# Patient Record
Sex: FEMALE | Race: WHITE | Employment: FULL TIME | URBAN - METROPOLITAN AREA
[De-identification: names, ages, dates, MRNs, and addresses within clinical notes are randomized per-mention and may not be internally consistent; named-entity substitution may affect disease eponyms.]

---

## 2016-09-03 LAB
CREATININE, EXTERNAL: 0.69
LDL-C, EXTERNAL: 122

## 2016-10-04 LAB — HBA1C MFR BLD HPLC: 5.5 %

## 2017-04-28 ENCOUNTER — OFFICE VISIT (OUTPATIENT)
Dept: INTERNAL MEDICINE CLINIC | Age: 42
End: 2017-04-28

## 2017-04-28 VITALS
SYSTOLIC BLOOD PRESSURE: 104 MMHG | BODY MASS INDEX: 29.27 KG/M2 | OXYGEN SATURATION: 98 % | TEMPERATURE: 98.5 F | HEIGHT: 61 IN | HEART RATE: 98 BPM | WEIGHT: 155 LBS | DIASTOLIC BLOOD PRESSURE: 68 MMHG | RESPIRATION RATE: 16 BRPM

## 2017-04-28 DIAGNOSIS — R42 VERTIGO: Primary | ICD-10-CM

## 2017-04-28 RX ORDER — BUPROPION HYDROCHLORIDE 75 MG/1
TABLET ORAL 2 TIMES DAILY
COMMUNITY
End: 2017-04-28

## 2017-04-28 NOTE — PROGRESS NOTES
Tania Lennon is a 42 y.o. female who was seen in clinic today (4/28/2017). Patient was seen with Dr Joseph Sosa (R2 at [facility]). Assessment & Plan:  Hung Ch was seen today for dizziness. Diagnoses and all orders for this visit:    Vertigo- this is a new problem, differential dx reviewed with the patient, sounds benign. Favor inner ear issue. Reassured does not sound serious. Reviewed vertigo exercises. Reviewed meclizine vs dramamine. Will have her see specialist.     -     REFERRAL TO ENT-OTOLARYNGOLOGY         Follow-up Disposition:  Return if symptoms worsen or fail to improve.       ----------------------------------------------------------------------    Subjective:  Vertigo  Patient complains of vertigo (off balance/lightheadedness). The symptoms started 2 weeks ago and are unchanged. Sensation is there constantly. Positions that make her symptoms worse: bending over, turning head, and getting up. She reports loud noises bother her ears (increase in sensitivity & lead to h/a, bilateral). Associated ear symptoms: fullness. She denies headaches, paresthesias, weakness, vision changes. Recent infections: none. Head trauma: denied. Previous workup/treatments: none  New medications: no.  Did go to [facility], on Monday. They recommended Dramamine w/o any relief. Prior to Admission medications    Medication Sig Start Date End Date Taking? Authorizing Provider   BUPROPION HCL (WELLBUTRIN PO) Take  by mouth. Yes Historical Provider   methylphenidate (RITALIN) 5 mg tablet Take  by mouth two (2) times a day. Yes Historical Provider   ESTRADIOL-NORGESTIMATE PO Take  by mouth. Yes Historical Provider   FLUoxetine (PROZAC) 20 mg capsule Take  by mouth daily. Yes Historical Provider   metFORMIN (GLUCOPHAGE) 500 mg tablet Take 2,000 mg by mouth daily. Yes Historical Provider   ASCORBATE CALCIUM (VITAMIN C PO) Take 1,000 mg by mouth daily.    Yes Historical Provider   CHOLECALCIFEROL, VITAMIN D3, (VITAMIN D3 PO) Take 2,000 Units by mouth daily. Yes Historical Provider   cyanocobalamin (VITAMIN B-12) 1,000 mcg tablet Take 1,000 mcg by mouth daily. Yes Historical Provider   Cetirizine (ZYRTEC) 10 mg cap Take  by mouth daily. Yes Historical Provider   albuterol (PROVENTIL HFA, VENTOLIN HFA, PROAIR HFA) 90 mcg/actuation inhaler Take 1 Puff by inhalation every six (6) hours as needed for Wheezing. 12/19/16  Yes Juan Daniel Espana MD          No Known Allergies        Review of Systems   Constitutional: Negative for chills, fever and malaise/fatigue. Respiratory: Negative for cough and shortness of breath. Cardiovascular: Negative for chest pain and palpitations. Gastrointestinal: Negative for abdominal pain, constipation, diarrhea, nausea and vomiting. Musculoskeletal: Negative for joint pain and myalgias. Neurological: Positive for dizziness. Objective:   Physical Exam   Constitutional: No distress. HENT:   Right Ear: Tympanic membrane is not erythematous and not bulging. No middle ear effusion. Left Ear: Tympanic membrane is not erythematous and not bulging. No middle ear effusion. Nose: No mucosal edema or rhinorrhea. Right sinus exhibits no maxillary sinus tenderness and no frontal sinus tenderness. Left sinus exhibits no maxillary sinus tenderness and no frontal sinus tenderness. Mouth/Throat: Uvula is midline and mucous membranes are normal. No oropharyngeal exudate or posterior oropharyngeal erythema. Eyes: Conjunctivae are normal. No scleral icterus. Neck: Neck supple. Cardiovascular: Regular rhythm and normal heart sounds. No murmur heard. Pulmonary/Chest: Effort normal and breath sounds normal. She has no wheezes. She has no rales. Lymphadenopathy:     She has no cervical adenopathy. Neurological: She has normal strength. No cranial nerve deficit or sensory deficit.          Visit Vitals    /68    Pulse 98    Temp 98.5 °F (36.9 °C) (Oral)    Resp 16    Ht 5' 1.45\" (1.561 m)    Wt 155 lb (70.3 kg)    LMP 04/14/2017 (Approximate)    SpO2 98%    BMI 28.86 kg/m2         Disclaimer:  Advised her to call back or return to office if symptoms worsen/change/persist.  Discussed expected course/resolution/complications of diagnosis in detail with patient. Medication risks/benefits/costs/interactions/alternatives discussed with patient. She was given an after visit summary which includes diagnoses, current medications, & vitals. She expressed understanding with the diagnosis and plan.         Bulmaro Sousa MD

## 2017-04-28 NOTE — MR AVS SNAPSHOT
Visit Information Date & Time Provider Department Dept. Phone Encounter #  
 4/28/2017  2:15 PM Gray Rebolledo, [address] [facility] [phone] [id number] Follow-up Instructions Return if symptoms worsen or fail to improve. Your Appointments 6/19/2017  2:30 PM  
PHYSICAL with Gray Rebolledo MD  
[facility] [address]) Appt Note: cpe; .  
 [address] [place] [address]  
[address] [place] [address] Upcoming Health Maintenance Date Due DTaP/Tdap/Td series (1 - Tdap) 8/7/1995 PAP AKA CERVICAL CYTOLOGY 8/7/1995 Allergies as of 4/28/2017  Review Complete On: 4/28/2017 By: Gray Rebolledo MD  
 No Known Allergies Current Immunizations  Reviewed on 4/28/2017 Name Date Influenza Vaccine 10/1/2016 Reviewed by Himanshu Greco RN on 4/28/2017 at  2:31 PM  
You Were Diagnosed With   
  
 Codes Comments Vertigo    -  Primary ICD-10-CM: S35 ICD-9-CM: 780.4 Vitals BP Pulse Temp Resp Height(growth percentile) Weight(growth percentile) 104/68 98 98.5 °F (36.9 °C) (Oral) 16 5' 1.45\" (1.561 m) 155 lb (70.3 kg) LMP SpO2 BMI OB Status Smoking Status 04/14/2017 (Approximate) 98% 28.86 kg/m2 Having regular periods Never Smoker BMI and BSA Data Body Mass Index Body Surface Area  
 28.86 kg/m 2 1.75 m 2 Preferred Pharmacy Pharmacy Name Phone [phone] [phone] Your Updated Medication List  
  
   
This list is accurate as of: 4/28/17  3:24 PM.  Always use your most recent med list.  
  
  
  
  
 albuterol 90 mcg/actuation inhaler Commonly known as:  PROVENTIL HFA, VENTOLIN HFA, PROAIR HFA Take 1 Puff by inhalation every six (6) hours as needed for Wheezing.   
  
 ESTRADIOL-NORGESTIMATE PO  
 Take  by mouth. FLUoxetine 20 mg capsule Commonly known as:  PROzac Take  by mouth daily. metFORMIN 500 mg tablet Commonly known as:  GLUCOPHAGE Take 2,000 mg by mouth daily. RITALIN 5 mg tablet Generic drug:  methylphenidate Take  by mouth two (2) times a day. VITAMIN B-12 1,000 mcg tablet Generic drug:  cyanocobalamin Take 1,000 mcg by mouth daily. VITAMIN C PO Take 1,000 mg by mouth daily. VITAMIN D3 PO Take 2,000 Units by mouth daily. WELLBUTRIN PO Take  by mouth. ZyrTEC 10 mg Cap Generic drug:  Cetirizine Take  by mouth daily. We Performed the Following REFERRAL TO ENT-OTOLARYNGOLOGY [YJL94 Custom] Follow-up Instructions Return if symptoms worsen or fail to improve. Referral Information Referral ID Referred By Referred To  
  
 [id number] Giles TONG [facility], [address] [place] [address] Visits Status Start Date End Date 1 New Request 4/28/17 4/28/18 If your referral has a status of pending review or denied, additional information will be sent to support the outcome of this decision. Patient Instructions Leonora Pryor or [facility] The other option is [facility]. [place] Exercises for Vertigo: Care Instructions Your Care Instructions Simple exercises can help you regain your balance when you have vertigo. If you have Ménière's disease, benign paroxysmal positional vertigo (BPPV), or another inner ear problem, you may have vertigo off and on. Do these exercises first thing in the morning and before you go to bed. You might get dizzy when you first start them. If this happens, try to do them for at least 5 minutes. Do a group of exercises at a time, starting at the top of the list. It may take several weeks before you can do all the exercises without feeling dizzy. Follow-up care is a key part of your treatment and safety. Be sure to make and go to all appointments, and call your doctor if you are having problems. It's also a good idea to know your test results and keep a list of the medicines you take. How can you care for yourself at home? Exercise 1 While sitting on the side of the bed and holding your head still: 
· Look up as far as you can. · Look down as far as you can. · Look from side to side as far as you can. · Stretch your arm straight out in front of you. Focus on your index finger. Continue to focus on your finger while you bring it to your nose. Exercise 2 While sitting on the side of the bed: · Bring your head as far back as you can. · Bring your head forward to touch your chin to your chest. 
· Turn your head from side to side. · Do these exercises first with your eyes open. Then try with your eyes closed. Exercise 3 While sitting on the side of the bed: · Shrug your shoulders straight upward, then relax them. · Bend over and try to touch the ground with your fingers. Then go back to a sitting position. · Toss a small ball from one hand to the other. Throw the ball higher than your eyes so you have to look up. Exercise 4 While standing (with someone close by if you feel uncomfortable): 
· Repeat Exercise 1. 
· Repeat Exercise 2. 
· Pass a ball between your legs and above your head. · Sit down and then stand up. Repeat. Turn around in a [place] a different way each time you stand. · With someone close by to help you, try the above exercises with your eyes closed. Exercise 5 In a room that is cleared of obstacles: 
· Walk to a corner of the room, turn to your right, and walk back to the starting point. Now, repeat and turn left. · Walk up and down a slope. Now try stairs. · While holding on to someone's arm, try these exercises with your eyes closed. When should you call for help? Watch closely for changes in your health, and be sure to contact your doctor if: 
· Your vertigo gets worse. · You want more information about vertigo. · You want more information about exercises for vertigo. Where can you learn more? Go to http://zaire-bebe.info/. Enter R847 in the search box to learn more about \"Caw[place]orne Exercises for Vertigo: Care Instructions. \" Current as of: July 29, 2016 Content Version: 11.2 © 6218-4806 Motor[employer]. Care instructions adapted under license by [employer] (which disclaims liability or warranty for this information). If you have questions about a medical condition or this instruction, always ask your healthcare professional. [address] any warranty or liability for your use of this information. Vertigo: Exercises Your Care Instructions Here are some examples of typical rehabilitation exercises for your condition. Start each exercise slowly. Ease off the exercise if you start to have pain. Your doctor or physical therapist will tell you when you can start these exercises and which ones will work best for you. How to do the exercises Note: Do these exercises twice a day. Try to progress to doing each head movement 15 to 20 times. Then try to do them with your eyes closed. Exercise 1 1. Stand with a chair in front of you and a wall behind you. If you begin to fall, you may use them for support. 2. Stand with your feet together and your arms at your sides. 3. Move your head up and down 10 times. Exercise 2 Move your head side to side 10 times. Exercise 3 Move your head diagonally up and down 10 times. Exercise 4 Move your head diagonally up and down 10 times on the other side. Follow-up care is a key part of your treatment and safety.  Be sure to make and go to all appointments, and call your doctor if you are having problems. It's also a good idea to know your test results and keep a list of the medicines you take. Where can you learn more? Go to http://zaire-bebe.info/. Enter F349 in the search box to learn more about \"Vertigo: Exercises. \" Current as of: July 29, 2016 Content Version: 11.2 © 8246-7081 BitWave. Care instructions adapted under license by [employer] (which disclaims liability or warranty for this information). If you have questions about a medical condition or this instruction, always ask your healthcare professional. [place] [address] any warranty or liability for your use of this information. Introducing [facility] [place] [facility]! [employer] introduces [employer] patient portal. Now you can access parts of your medical record, email your doctor's office, and request medication refills online. 1. In your internet browser, go to [email or web address] 2. Click on the First Time User? Click Here link in the Sign In box. You will see the New Member Sign Up page. 3. Enter your [employer] Access Code exactly as it appears below. You will not need to use this code after youve completed the sign-up process. If you do not sign up before the expiration date, you must request a new code. · [employer] Access Code: [email or web address] Expires: 7/27/2017  1:44 PM 
 
4. Enter the last four digits of your Social Security Number (xxxx) and Date of Birth (mm/dd/yyyy) as indicated and click Submit. You will be taken to the next sign-up page. 5. Create a Scarlet Lens Productionst ID. This will be your [employer] login ID and cannot be changed, so think of one that is secure and easy to remember. 6. Create a [employer] password. You can change your password at any time. 7. Enter your Password Reset Question and Answer. This can be used at a later time if you forget your password. 8. Enter your e-mail address.  You will receive e-mail notification when new information is available in [employer]. 9. Click Sign Up. You can now view and download portions of your medical record. 10. Click the Download Summary menu link to download a portable copy of your medical information. If you have questions, please visit the Frequently Asked Questions section of the [employer] website. Remember, [employer] is NOT to be used for urgent needs. For medical emergencies, dial 911. Now available from your iPhone and Android! Please provide this summary of care documentation to your next provider. Your primary care clinician is listed as Martina Pisano. If you have any questions after today's visit, please call [phone].

## 2017-04-28 NOTE — PATIENT INSTRUCTIONS
Skip Morse or [facility]  The other option is [facility]. Caw[place]orne Exercises for Vertigo: Care Instructions  Your Care Instructions  Simple exercises can help you regain your balance when you have vertigo. If you have Ménière's disease, benign paroxysmal positional vertigo (BPPV), or another inner ear problem, you may have vertigo off and on. Do these exercises first thing in the morning and before you go to bed. You might get dizzy when you first start them. If this happens, try to do them for at least 5 minutes. Do a group of exercises at a time, starting at the top of the list. It may take several weeks before you can do all the exercises without feeling dizzy. Follow-up care is a key part of your treatment and safety. Be sure to make and go to all appointments, and call your doctor if you are having problems. It's also a good idea to know your test results and keep a list of the medicines you take. How can you care for yourself at home? Exercise 1  While sitting on the side of the bed and holding your head still:  · Look up as far as you can. · Look down as far as you can. · Look from side to side as far as you can. · Stretch your arm straight out in front of you. Focus on your index finger. Continue to focus on your finger while you bring it to your nose. Exercise 2  While sitting on the side of the bed:  · Bring your head as far back as you can. · Bring your head forward to touch your chin to your chest.  · Turn your head from side to side. · Do these exercises first with your eyes open. Then try with your eyes closed. Exercise 3  While sitting on the side of the bed:  · Shrug your shoulders straight upward, then relax them. · Bend over and try to touch the ground with your fingers. Then go back to a sitting position. · Toss a small ball from one hand to the other. Throw the ball higher than your eyes so you have to look up.   Exercise 4  While standing (with someone close by if you feel uncomfortable):  · Repeat Exercise 1.  · Repeat Exercise 2.  · Pass a ball between your legs and above your head. · Sit down and then stand up. Repeat. Turn around in a [place] a different way each time you stand. · With someone close by to help you, try the above exercises with your eyes closed. Exercise 5  In a room that is cleared of obstacles:  · Walk to a corner of the room, turn to your right, and walk back to the starting point. Now, repeat and turn left. · Walk up and down a slope. Now try stairs. · While holding on to someone's arm, try these exercises with your eyes closed. When should you call for help? Watch closely for changes in your health, and be sure to contact your doctor if:  · Your vertigo gets worse. · You want more information about vertigo. · You want more information about exercises for vertigo. Where can you learn more? Go to http://zaire-bebe.info/. Enter I748 in the search box to learn more about \"Caw[place]orne Exercises for Vertigo: Care Instructions. \"  Current as of: July 29, 2016  Content Version: 11.2  © 1492-8441 [employer]. Care instructions adapted under license by [employer] (which disclaims liability or warranty for this information). If you have questions about a medical condition or this instruction, always ask your healthcare professional. [place] [address] any warranty or liability for your use of this information. Vertigo: Exercises  Your Care Instructions  Here are some examples of typical rehabilitation exercises for your condition. Start each exercise slowly. Ease off the exercise if you start to have pain. Your doctor or physical therapist will tell you when you can start these exercises and which ones will work best for you. How to do the exercises  Note: Do these exercises twice a day. Try to progress to doing each head movement 15 to 20 times.  Then try to do them with your eyes closed. Exercise 1    1. Stand with a chair in front of you and a wall behind you. If you begin to fall, you may use them for support. 2. Stand with your feet together and your arms at your sides. 3. Move your head up and down 10 times. Exercise 2    Move your head side to side 10 times. Exercise 3    Move your head diagonally up and down 10 times. Exercise 4    Move your head diagonally up and down 10 times on the other side. Follow-up care is a key part of your treatment and safety. Be sure to make and go to all appointments, and call your doctor if you are having problems. It's also a good idea to know your test results and keep a list of the medicines you take. Where can you learn more? Go to http://zaire-bebe.info/. Enter F349 in the search box to learn more about \"Vertigo: Exercises. \"  Current as of: July 29, 2016  Content Version: 11.2  © 5664-0598 [employer]. Care instructions adapted under license by [employer] (which disclaims liability or warranty for this information). If you have questions about a medical condition or this instruction, always ask your healthcare professional. [place] [address] any warranty or liability for your use of this information.

## 2017-06-19 ENCOUNTER — OFFICE VISIT (OUTPATIENT)
Dept: INTERNAL MEDICINE CLINIC | Age: 42
End: 2017-06-19

## 2017-06-19 VITALS
HEART RATE: 86 BPM | HEIGHT: 61 IN | DIASTOLIC BLOOD PRESSURE: 68 MMHG | OXYGEN SATURATION: 98 % | BODY MASS INDEX: 29.45 KG/M2 | TEMPERATURE: 98.2 F | WEIGHT: 156 LBS | SYSTOLIC BLOOD PRESSURE: 102 MMHG | RESPIRATION RATE: 12 BRPM

## 2017-06-19 DIAGNOSIS — R73.03 PRE-DIABETES: ICD-10-CM

## 2017-06-19 DIAGNOSIS — G47.11 HYPERSOMNIA, IDIOPATHIC: ICD-10-CM

## 2017-06-19 DIAGNOSIS — F41.9 ANXIETY: ICD-10-CM

## 2017-06-19 DIAGNOSIS — Z23 ENCOUNTER FOR IMMUNIZATION: ICD-10-CM

## 2017-06-19 DIAGNOSIS — Z00.00 ROUTINE PHYSICAL EXAMINATION: Primary | ICD-10-CM

## 2017-06-19 PROBLEM — R42 MAL DE DEBARQUEMENT: Status: ACTIVE | Noted: 2017-06-19

## 2017-06-19 RX ORDER — METHYLPHENIDATE HYDROCHLORIDE 5 MG/1
5 TABLET ORAL 3 TIMES DAILY
COMMUNITY
End: 2018-04-13 | Stop reason: SDUPTHER

## 2017-06-19 NOTE — MR AVS SNAPSHOT
Visit Information Date & Time Provider Department Dept. Phone Encounter #  
 6/19/2017  2:30 PM Kaycee Garzon, [address] [place] [address] Internal Medicine [phone] [id number] Follow-up Instructions Return in about 1 year (around 6/19/2018) for FULL PHYSICAL - 30 minutes. Upcoming Health Maintenance Date Due DTaP/Tdap/Td series (1 - Tdap) 8/7/1995 INFLUENZA AGE 9 TO ADULT 8/1/2017 BREAST CANCER SCRN MAMMOGRAM 1/27/2018 PAP AKA CERVICAL CYTOLOGY 1/21/2019 Allergies as of 6/19/2017  Review Complete On: 6/19/2017 By: Kaycee Garzon MD  
 No Known Allergies Current Immunizations  Reviewed on 6/19/2017 Name Date Influenza Vaccine 10/1/2016 Tdap  Incomplete Reviewed by Juan Angulo RN on 6/19/2017 at  2:47 PM  
You Were Diagnosed With   
  
 Codes Comments Routine physical examination    -  Primary ICD-10-CM: Z00.00 ICD-9-CM: V70.0 Encounter for immunization     ICD-10-CM: Q69 ICD-9-CM: V03.89 Hypersomnia, idiopathic     ICD-10-CM: G47.11 ICD-9-CM: 780.54 Anxiety     ICD-10-CM: F41.9 ICD-9-CM: 300.00 Pre-diabetes     ICD-10-CM: R73.03 
ICD-9-CM: 790.29 Vitals BP Pulse Temp Resp Height(growth percentile) Weight(growth percentile) 102/68 86 98.2 °F (36.8 °C) (Oral) 12 5' 1.4\" (1.56 m) 156 lb (70.8 kg) LMP SpO2 BMI OB Status Smoking Status 06/08/2017 (Approximate) 98% 29.09 kg/m2 Having regular periods Never Smoker BMI and BSA Data Body Mass Index Body Surface Area  
 29.09 kg/m 2 1.75 m 2 Preferred Pharmacy Pharmacy Name Phone [phone] [phone] Your Updated Medication List  
  
   
This list is accurate as of: 6/19/17  3:21 PM.  Always use your most recent med list.  
  
  
  
  
 albuterol 90 mcg/actuation inhaler Commonly known as:  PROVENTIL HFA, VENTOLIN HFA, PROAIR HFA  
 Take 1 Puff by inhalation every six (6) hours as needed for Wheezing. ESTRADIOL-NORGESTIMATE PO Take  by mouth. FLUoxetine 20 mg capsule Commonly known as:  PROzac Take  by mouth daily. metFORMIN 500 mg tablet Commonly known as:  GLUCOPHAGE Take 2,000 mg by mouth daily. RITALIN 5 mg tablet Generic drug:  methylphenidate Take 5 mg by mouth three (3) times daily. VITAMIN B-12 1,000 mcg tablet Generic drug:  cyanocobalamin Take 1,000 mcg by mouth daily. VITAMIN C PO Take 1,000 mg by mouth daily. VITAMIN D3 PO Take 2,000 Units by mouth daily. WELLBUTRIN PO Take 75 mg by mouth daily. ZyrTEC 10 mg Cap Generic drug:  Cetirizine Take  by mouth daily. We Performed the Following CBC W/O DIFF [61228 CPT(R)] METABOLIC PANEL, COMPREHENSIVE [57623 CPT(R)] MN IMMUNIZ ADMIN,1 SINGLE/COMB VAC/TOXOID [id number] CPT(R)] TETANUS, DIPHTHERIA TOXOIDS AND ACELLULAR PERTUSSIS VACCINE (TDAP), IN INDIVIDS. >=7, IM Z632174 CPT(R)] Follow-up Instructions Return in about 1 year (around 6/19/2018) for FULL PHYSICAL - 30 minutes. Patient Instructions Well Visit, Ages 25 to 48: Care Instructions Your Care Instructions Physical exams can help you stay healthy. Your doctor has checked your overall health and may have suggested ways to take good care of yourself. He or she also may have recommended tests. At home, you can help prevent illness with healthy eating, regular exercise, and other steps. Follow-up care is a key part of your treatment and safety. Be sure to make and go to all appointments, and call your doctor if you are having problems. It's also a good idea to know your test results and keep a list of the medicines you take. How can you care for yourself at home? · Reach and stay at a healthy weight.  This will lower your risk for many problems, such as obesity, diabetes, heart disease, and high blood pressure. · Get at least 30 minutes of physical activity on most days of the week. Walking is a good choice. You also may want to do other activities, such as running, swimming, cycling, or playing tennis or team sports. Discuss any changes in your exercise program with your doctor. · Do not smoke or allow others to smoke around you. If you need help quitting, talk to your doctor about stop-smoking programs and medicines. These can increase your chances of quitting for good. · Talk to your doctor about whether you have any risk factors for sexually transmitted infections (STIs). Having one sex partner (who does not have STIs and does not have sex with anyone else) is a good way to avoid these infections. · Use birth control if you do not want to have children at this time. Talk with your doctor about the choices available and what might be best for you. · Protect your skin from too much sun. When you're outdoors from 10 a.m. to 4 p.m., stay in the shade or cover up with clothing and a hat with a wide brim. Wear sunglasses that block UV rays. Even when it's cloudy, put broad-spectrum sunscreen (SPF 30 or higher) on any exposed skin. · See a dentist one or two times a year for checkups and to have your teeth cleaned. · Wear a seat belt in the car. · Drink alcohol in moderation, if at all. That means no more than 2 drinks a day for men and 1 drink a day for women. Follow your doctor's advice about when to have certain tests. These tests can spot problems early. For everyone · Cholesterol. Have the fat (cholesterol) in your blood tested after age 21. Your doctor will tell you how often to have this done based on your age, family history, or other things that can increase your risk for heart disease. · Blood pressure. Have your blood pressure checked during a routine doctor visit.  Your doctor will tell you how often to check your blood pressure based on your age, your blood pressure results, and other factors. · Vision. Talk with your doctor about how often to have a glaucoma test. 
· Diabetes. Ask your doctor whether you should have tests for diabetes. · Colon cancer. Have a test for colon cancer at age 48. You may have one of several tests. If you are younger than 48, you may need a test earlier if you have any risk factors. Risk factors include whether you already had a precancerous polyp removed from your colon or whether your parent, brother, sister, or child has had colon cancer. For women · Breast exam and mammogram. Talk to your doctor about when you should have a clinical breast exam and a mammogram. Medical experts differ on whether and how often women under 50 should have these tests. Your doctor can help you decide what is right for you. · Pap test and pelvic exam. Begin Pap tests at age 24. A Pap test is the best way to find cervical cancer. The test often is part of a pelvic exam. Ask how often to have this test. 
· Tests for sexually transmitted infections (STIs). Ask whether you should have tests for STIs. You may be at risk if you have sex with more than one person, especially if your partners do not wear condoms. For men · Tests for sexually transmitted infections (STIs). Ask whether you should have tests for STIs. You may be at risk if you have sex with more than one person, especially if you do not wear a condom. · Testicular cancer exam. Ask your doctor whether you should check your testicles regularly. · Prostate exam. Talk to your doctor about whether you should have a blood test (called a PSA test) for prostate cancer. Experts differ on whether and when men should have this test. Some experts suggest it if you are older than 39 and are -American or have a father or brother who got prostate cancer when he was younger than 72. When should you call for help? Watch closely for changes in your health, and be sure to contact your doctor if you have any problems or symptoms that concern you. Where can you learn more? Go to http://zaire-bebe.info/. Enter P072 in the search box to learn more about \"Well Visit, Ages 25 to 48: Care Instructions. \" Current as of: July 19, 2016 Content Version: 11.2 © 1008-8243 [employer]. Care instructions adapted under license by [employer] (which disclaims liability or warranty for this information). If you have questions about a medical condition or this instruction, always ask your healthcare professional. [place] [address] any warranty or liability for your use of this information. Introducing [facility]! Marion Peacock introduces [employer] [facility] [facility] patient portal. Now you can access parts of your medical record, email your doctor's office, and request medication refills online. 1. In your internet browser, go to [email or web address]. Echolocation/[address] 2. Click on the First Time User? Click Here link in the Sign In box. You will see the New Member Sign Up page. 3. Enter your [employer] [facility] Access Code exactly as it appears below. You will not need to use this code after youve completed the sign-up process. If you do not sign up before the expiration date, you must request a new code. · [employer] [facility] [facility] Access Code: [email or web address] Expires: 7/27/2017  1:44 PM 
 
4. Enter the last four digits of your Social Security Number (xxxx) and Date of Birth (mm/dd/yyyy) as indicated and click Submit. You will be taken to the next sign-up page. 5. Create a [employer] [facility] [facility] ID. This will be your [employer] [facility] login ID and cannot be changed, so think of one that is secure and easy to remember. 6. Create a [employer] [facility] password. You can change your password at any time. 7. Enter your Password Reset Question and Answer. This can be used at a later time if you forget your password. 8. Enter your e-mail address. You will receive e-mail notification when new information is available in [address] 9. Click Sign Up. You can now view and download portions of your medical record. 10. Click the Download Summary menu link to download a portable copy of your medical information. If you have questions, please visit the Frequently Asked Questions section of the [email or web address] website. Remember, [email or web address] is NOT to be used for urgent needs. For medical emergencies, dial 911. Now available from your iPhone and Android! Please provide this summary of care documentation to your next provider. Your primary care clinician is listed as Wabeno Curl. If you have any questions after today's visit, please call [phone].

## 2017-06-19 NOTE — PROGRESS NOTES
Hector Lennon is a 42 y.o. female  who present for routine immunizations. she  denies any symptoms , reactions or allergies that would exclude them from being immunized today. Risks and adverse reactions were discussed and the VIS was given to them. All questions were addressed. she was observed for 5 min post injection. There were no reactions observed.     Edgar Painting RN

## 2017-06-19 NOTE — PATIENT INSTRUCTIONS

## 2017-06-19 NOTE — PROGRESS NOTES
Fernando Lennon is a 42 y.o. female who was seen in clinic today (6/19/2017) for a full physical.       Assessment & Plan:   Diagnoses and all orders for this visit:    1. Routine physical examination  -     METABOLIC PANEL, COMPREHENSIVE  -     CBC W/O DIFF    2. Encounter for immunization  -     TETANUS, DIPHTHERIA TOXOIDS AND ACELLULAR PERTUSSIS VACCINE (TDAP), IN INDIVIDS. >=7, IM  -     CO IMMUNIZ ADMIN,1 SINGLE/COMB VAC/TOXOID    3. Hypersomnia, idiopathic- stable, continue current treatment, [facility]  reviewed & will defer to specialist     4. Anxiety- well controlled, continue current treatment, she will cont w/ OBGYN writing meds for now, did review effects of SSRI on weight and how Wellbutrin is not approved for anxiety but since doing well will not recommend any changes. 5. Pre-diabetes- improved, most recent labs at goal, cont current meds, cont to work on diet & exercise. Follow-up Disposition:  Return in about 1 year (around 6/19/2018) for FULL PHYSICAL - 30 minutes. ------------------------------------------------------------------------------------------    Subjective:   Cherri Mckenzie is here today for a full physical.      Health Maintenance  Immunizations:    Influenza: up to date. Tetanus: not UTD- will do today. Cancer screening:    Cervical: reviewed guidelines, UTD. Breast: reviewed guidelines, reviewed SBE with her, UTD      Patient Care Team:  Bertram Oreilly MD as PCP - General (Internal Medicine)  Mechelle Lewis MD (Obstetrics & Gynecology)  Hiram Morales MD (Allergy)  Orlando Hernández MD (Dermatology)  Yolanda Solomon MD (Sleep Medicine)         The following sections were reviewed & updated as appropriate: PMH, PSH, FH, and SH.     Patient Active Problem List   Diagnosis Code    Celiac disease K90.0    Perennial allergic rhinitis J30.89    Pre-diabetes R73.03    Hypersomnia, idiopathic G47.11    Anxiety F41.9    Mal de debarquement R42          Prior to Admission medications    Medication Sig Start Date End Date Taking? Authorizing Provider   methylphenidate (RITALIN) 5 mg tablet Take 5 mg by mouth three (3) times daily. Yes Historical Provider   BUPROPION HCL (WELLBUTRIN PO) Take 75 mg by mouth daily. Yes Historical Provider   ESTRADIOL-NORGESTIMATE PO Take  by mouth. Yes Historical Provider   FLUoxetine (PROZAC) 20 mg capsule Take  by mouth daily. Yes Historical Provider   metFORMIN (GLUCOPHAGE) 500 mg tablet Take 2,000 mg by mouth daily. Yes Historical Provider   ASCORBATE CALCIUM (VITAMIN C PO) Take 1,000 mg by mouth daily. Yes Historical Provider   CHOLECALCIFEROL, VITAMIN D3, (VITAMIN D3 PO) Take 2,000 Units by mouth daily. Yes Historical Provider   cyanocobalamin (VITAMIN B-12) 1,000 mcg tablet Take 1,000 mcg by mouth daily. Yes Historical Provider   Cetirizine (ZYRTEC) 10 mg cap Take  by mouth daily. Yes Historical Provider   albuterol (PROVENTIL HFA, VENTOLIN HFA, PROAIR HFA) 90 mcg/actuation inhaler Take 1 Puff by inhalation every six (6) hours as needed for Wheezing. 12/19/16  Yes Can Lawson MD          No Known Allergies           Review of Systems   Constitutional: Negative for chills and fever. Respiratory: Negative for cough and shortness of breath. Cardiovascular: Negative for chest pain and palpitations. Gastrointestinal: Negative for abdominal pain, blood in stool, constipation, diarrhea, heartburn, nausea and vomiting. Musculoskeletal: Negative for joint pain and myalgias. Neurological: Negative for tingling, focal weakness and headaches. Endo/Heme/Allergies: Does not bruise/bleed easily. Psychiatric/Behavioral: Negative for depression. The patient is not nervous/anxious and does not have insomnia. Objective:   Physical Exam   Constitutional: She appears well-developed. No distress.    HENT:   Right Ear: Tympanic membrane, external ear and ear canal normal.   Left Ear: Tympanic membrane, external ear and ear canal normal.   Nose: Nose normal.   Mouth/Throat: Uvula is midline, oropharynx is clear and moist and mucous membranes are normal. No posterior oropharyngeal erythema. Eyes: Conjunctivae and lids are normal. No scleral icterus. Neck: Neck supple. Carotid bruit is not present. No thyromegaly present. Cardiovascular: Regular rhythm and normal heart sounds. No murmur heard. Pulses:       Dorsalis pedis pulses are 2+ on the right side, and 2+ on the left side. Posterior tibial pulses are 2+ on the right side, and 2+ on the left side. Pulmonary/Chest: Effort normal and breath sounds normal. She has no wheezes. She has no rales. Abdominal: Soft. Bowel sounds are normal. She exhibits no mass. There is no hepatosplenomegaly. There is no tenderness. Musculoskeletal: Normal range of motion. She exhibits no edema. Cervical back: Normal.        Thoracic back: She exhibits no bony tenderness. Lumbar back: Normal.   Lymphadenopathy:     She has no cervical adenopathy. Neurological: She has normal strength. No sensory deficit. Skin: No rash noted. Psychiatric: She has a normal mood and affect. Her behavior is normal.          Visit Vitals    /68    Pulse 86    Temp 98.2 °F (36.8 °C) (Oral)    Resp 12    Ht 5' 1.4\" (1.56 m)    Wt 156 lb (70.8 kg)    LMP 06/08/2017 (Approximate)    SpO2 98%    BMI 29.09 kg/m2          Advised her to call back or return to office if symptoms worsen/change/persist.  Discussed expected course/resolution/complications of diagnosis in detail with patient. Medication risks/benefits/costs/interactions/alternatives discussed with patient. She was given an after visit summary which includes diagnoses, current medications, & vitals. She expressed understanding with the diagnosis and plan.         Joe Vaz MD

## 2017-06-20 LAB
ALBUMIN SERPL-MCNC: 4 G/DL (ref 3.5–5.5)
ALBUMIN/GLOB SERPL: 1.5 {RATIO} (ref 1.2–2.2)
ALP SERPL-CCNC: 80 IU/L (ref 39–117)
ALT SERPL-CCNC: 12 IU/L (ref 0–32)
AST SERPL-CCNC: 14 IU/L (ref 0–40)
BILIRUB SERPL-MCNC: <0.2 MG/DL (ref 0–1.2)
BUN SERPL-MCNC: 14 MG/DL (ref 6–24)
BUN/CREAT SERPL: 22 (ref 9–23)
CALCIUM SERPL-MCNC: 9.4 MG/DL (ref 8.7–10.2)
CHLORIDE SERPL-SCNC: 99 MMOL/L (ref 96–106)
CO2 SERPL-SCNC: 24 MMOL/L (ref 18–29)
CREAT SERPL-MCNC: 0.65 MG/DL (ref 0.57–1)
ERYTHROCYTE [DISTWIDTH] IN BLOOD BY AUTOMATED COUNT: 14.2 % (ref 12.3–15.4)
GLOBULIN SER CALC-MCNC: 2.7 G/DL (ref 1.5–4.5)
GLUCOSE SERPL-MCNC: 137 MG/DL (ref 65–99)
HCT VFR BLD AUTO: 35.8 % (ref 34–46.6)
HGB BLD-MCNC: 12.1 G/DL (ref 11.1–15.9)
MCH RBC QN AUTO: 31 PG (ref 26.6–33)
MCHC RBC AUTO-ENTMCNC: 33.8 G/DL (ref 31.5–35.7)
MCV RBC AUTO: 92 FL (ref 79–97)
PLATELET # BLD AUTO: 392 X10E3/UL (ref 150–379)
POTASSIUM SERPL-SCNC: 4 MMOL/L (ref 3.5–5.2)
PROT SERPL-MCNC: 6.7 G/DL (ref 6–8.5)
RBC # BLD AUTO: 3.9 X10E6/UL (ref 3.77–5.28)
SODIUM SERPL-SCNC: 137 MMOL/L (ref 134–144)
WBC # BLD AUTO: 9.1 X10E3/UL (ref 3.4–10.8)

## 2017-12-28 ENCOUNTER — OFFICE VISIT (OUTPATIENT)
Dept: SLEEP MEDICINE | Age: 42
End: 2017-12-28

## 2017-12-28 VITALS
BODY MASS INDEX: 30.21 KG/M2 | WEIGHT: 160 LBS | SYSTOLIC BLOOD PRESSURE: 134 MMHG | HEART RATE: 94 BPM | OXYGEN SATURATION: 95 % | TEMPERATURE: 98.3 F | HEIGHT: 61 IN | DIASTOLIC BLOOD PRESSURE: 65 MMHG

## 2017-12-28 DIAGNOSIS — G47.30 HYPERSOMNIA WITH SLEEP APNEA: Primary | ICD-10-CM

## 2017-12-28 DIAGNOSIS — G47.10 HYPERSOMNIA WITH SLEEP APNEA: Primary | ICD-10-CM

## 2017-12-28 DIAGNOSIS — R73.03 PRE-DIABETES: ICD-10-CM

## 2017-12-28 NOTE — PROGRESS NOTES
[address] [facility] [place]. [place], [address]  Tel.  [phone]  Fax. [address] [facility] [address]  [place], [address] [facility]  Tel.  [phone]  Fax. [phone] [address] [facility] [address]  Tel.  [phone]  Fax. [phone]         Subjective:      Corinna Mata is an 36 y.o. female self- referred for evaluation for a sleep disorder. She complains of snoring, excessive daytime sleepiness associated with wanting to switch sleep providers. Symptoms began several months ago, unchanged since that time. She usually can fall asleep in variable minutes. She is wondering why she is so sleepy during the daytime yet cant settle down easily at night. Family or house members note snoring. She denies falling asleep while driving. Zofia Lennon does not wake up frequently at night. She is bothered by waking up too early and left unable to get back to sleep. She actually sleeps about 6 hours at night and wakes up about 3 times during the night. She does work shifts:  First Shift. Fadumo De La Cruz indicates she does not get too little sleep at night. Her bedtime is 1000. She awakens at 0630. She does take naps. She takes 1 naps a week lasting 45 min to an hour, Minute(s). She has the following observed behaviors:  ;  .  Other remarks:   previous records reviewed. She had a sleep study in 2015, at that time AHI was 4/hour, MSLT done which showed MSL less than 2 minutes with 4 naps, no REM periods. Since that time, she has gained 25 pounds. She has been maintained on ritalin 5 mg TID. Last dose around 3 pm for hypersomnia. Since 2015, she has had difficulty losing weight and has been diagnosed with pre-diabetes  She denies cataplexy, or sleep paralysis  Fletcher Sleepiness Score: 16   which reflect moderate daytime drowsiness.     Allergies   Allergen Reactions    Wheat Swelling         Current Outpatient Prescriptions:     methylphenidate (RITALIN) 5 mg tablet, Take 5 mg by mouth three (3) times daily. , Disp: , Rfl:     BUPROPION HCL (WELLBUTRIN PO), Take 75 mg by mouth daily. , Disp: , Rfl:     ESTRADIOL-NORGESTIMATE PO, Take  by mouth., Disp: , Rfl:     FLUoxetine (PROZAC) 20 mg capsule, Take  by mouth daily. , Disp: , Rfl:     metFORMIN (GLUCOPHAGE) 500 mg tablet, Take 2,000 mg by mouth daily. , Disp: , Rfl:     ASCORBATE CALCIUM (VITAMIN C PO), Take 1,000 mg by mouth daily. , Disp: , Rfl:     CHOLECALCIFEROL, VITAMIN D3, (VITAMIN D3 PO), Take 2,000 Units by mouth daily. , Disp: , Rfl:     cyanocobalamin (VITAMIN B-12) 1,000 mcg tablet, Take 1,000 mcg by mouth daily. , Disp: , Rfl:     Cetirizine (ZYRTEC) 10 mg cap, Take  by mouth daily. , Disp: , Rfl:     albuterol (PROVENTIL HFA, VENTOLIN HFA, PROAIR HFA) 90 mcg/actuation inhaler, Take 1 Puff by inhalation every six (6) hours as needed for Wheezing., Disp: 1 Inhaler, Rfl: 2     She  has a past medical history of Anxiety (2016); Celiac disease (2016); Hypersomnia, idiopathic (2016); Perennial allergic rhinitis (2016); and Pre-diabetes (2016). She  has a past surgical history that includes hx  section (2004); hx  section (10/2005); hx dilation and curettage (10/2003); and hx endoscopy (). She family history includes Alcohol abuse in her maternal uncle; Diabetes in her father; Heart Disease in her father; High Cholesterol in her father; Hypertension in her father; No Known Problems in her brother, brother, mother, and son; Osteoporosis in her maternal grandmother; Other in her daughter; Sleep Apnea in her father; Stroke in her maternal grandfather and maternal grandmother. She  reports that she has never smoked. She has never used smokeless tobacco. She reports that she drinks alcohol. She reports that she does not use illicit drugs. Review of Systems:  Constitutional: +weight gain. Eyes:  No blurred vision.   CVS:  No significant chest pain  Pulm:  No significant shortness of breath  GI:  No significant nausea or vomiting  :  No significant nocturia  Musculoskeletal:  No significant joint pain at night  Skin:  No significant rashes  Neuro:  No significant dizziness   Psych:  No active mood issues    Sleep Review of Systems: notable for + difficulty falling asleep; infrequent awakenings at night;  regular dreaming noted; no nightmares ; no early morning headaches; no memory problems; no concentration issues; no history of any automobile or occupational accidents due to daytime drowsiness. Objective:     Visit Vitals    /65    Pulse 94    Temp 98.3 °F (36.8 °C) (Oral)    Ht 5' 1.4\" (1.56 m)    Wt 160 lb (72.6 kg)    SpO2 95%    BMI 29.84 kg/m2         General:   Not in acute distress   Eyes:  Anicteric sclerae, no obvious strabismus   Nose:  No obvious nasal septum deviation    Oropharynx:   Class 3 oropharyngeal outlet, thick tongue base, enlarged and boggy uvula, low-lying soft palate, narrow tonsilo-pharyngeal pilars   Tonsils:   tonsils are present and normal   Neck:   Neck circ. in \"inches\": 14; midline trachea   Chest/Lungs:  Equal lung expansion, clear on auscultation    CVS:  Normal rate, regular rhythm; no JVD   Skin:  Warm to touch; no obvious rashes   Neuro:  No focal deficits ; no obvious tremor    Psych:  Normal affect,  normal countenance;          Assessment:       ICD-10-CM ICD-9-CM    1. Hypersomnia with sleep apnea G47.10 780.53 SLEEP STUDY UNATTENDED, 4 CHANNEL    G47.30     2. Pre-diabetes R73.03 790.29          Plan:     * The patient currently has a Moderate Risk for having sleep apnea. STOP-BANG score 3.  * PSG was ordered for initial evaluation. I want to reevaluate if she now has sleep apnea contributing to daytime sleepiness. I have reviewed the different types of sleep studies. Attended sleep studies and home sleep apnea tests. Home sleep testing tests only for the presence and severity of sleep apnea.  she understands that if the HSAT does not provide reliable result(such as poor data/failed HSAT recording), she may have to repeat the HSAT or come in for an attended polysomnogram. .clsot  she is not against a trial of PAP if found to have significant sleep apnea. The treatment plan was reviewed with the patient in detail and reviewed with the patient and the lead technologist. she understands that the lead technologist will be calling her  with the results and assisting with the next step in the treatment plan as outlined today during the consultation with me. All of her questions were addressed. If she has an AHI over 5 /hour, we will start APAP for a month and then reevaluate her symptoms of daytime sleepiness and then proceed with PSG/MSLT for further evaluation of hypersomnia. In the meantime, she can continue the ritalin but try not to take it after 2 pm, as it may be interfering with her sleep onset. Proper sleep hygiene and safe driving were reviewed today and all of her questions were addressed. * She was provided information on sleep apnea including coresponding risk factors and the importance of proper treatment. * Counseling was provided regarding proper sleep hygiene and safe driving. 2. Preidabetes -she continues on her current regimen. I have reviewed the relationship between sleep disordered breathing as it relates to diabetes.       Marcelina Acevedo MD  Diplomate in Sleep Medicine  [facility]

## 2017-12-28 NOTE — PATIENT INSTRUCTIONS
[address] [place]. [place], [address]  Tel.  [phone]  Fax. 100 [facility] 60  [place], [address] [facility]  Tel.  [phone]  Fax. [phone] [address]  Tel.  [phone]  Fax. [phone]     Sleep Apnea: After Your Visit  Your Care Instructions  Sleep apnea occurs when you frequently stop breathing for 10 seconds or longer during sleep. It can be mild to severe, based on the number of times per hour that you stop breathing or have slowed breathing. Blocked or narrowed airways in your nose, mouth, or throat can cause sleep apnea. Your airway can become blocked when your throat muscles and tongue relax during sleep. Sleep apnea is common, occurring in 1 out of 20 individuals. Individuals having any of the following characteristics should be evaluated and treated right away due to high risk and detrimental consequences from untreated sleep apnea:  1. Obesity  2. Congestive Heart failure  3. Atrial Fibrillation  4. Uncontrolled Hypertension  5. Type II Diabetes  6. Night-time Arrhythmias  7. Stroke  8. Pulmonary Hypertension  9. High-risk Driving Populations (pilots, truck drivers, etc.)  10. Patients Considering Weight-loss Surgery    How do you know you have sleep apnea? You probably have sleep apnea if you answer 'yes' to 3 or more of the following questions:  S - Have you been told that you Snore? T - Are you often Tired during the day? O - Has anyone Observed you stop breathing while sleeping? P- Do you have (or are being treated for) high blood Pressure? B - Are you obese (Body Mass Index > 35)? A - Is your Age 48years old or older? N - Is your Neck size greater than 16 inches? G - Are you male Gender? A sleep physician can prescribe a breathing device that prevents tissues in the throat from blocking your airway.  Or your doctor may recommend using a dental device (oral breathing device) to help keep your airway open. In some cases, surgery may be needed to remove enlarged tissues in the throat. Follow-up care is a key part of your treatment and safety. Be sure to make and go to all appointments, and call your doctor if you are having problems. It's also a good idea to know your test results and keep a list of the medicines you take. How can you care for yourself at home? · Lose weight, if needed. It may reduce the number of times you stop breathing or have slowed breathing. · Go to bed at the same time every night. · Sleep on your side. It may stop mild apnea. If you tend to roll onto your back, sew a pocket in the back of your pajama top. Put a tennis ball into the pocket, and stitch the pocket shut. This will help keep you from sleeping on your back. · Avoid alcohol and medicines such as sleeping pills and sedatives before bed. · Do not smoke. Smoking can make sleep apnea worse. If you need help quitting, talk to your doctor about stop-smoking programs and medicines. These can increase your chances of quitting for good. · Prop up the head of your bed 4 to 6 inches by putting bricks under the legs of the bed. · Treat breathing problems, such as a stuffy nose, caused by a cold or allergies. · Use a continuous positive airway pressure (CPAP) breathing machine if lifestyle changes do not help your apnea and your doctor recommends it. The machine keeps your airway from closing when you sleep. · If CPAP does not help you, ask your doctor whether you should try other breathing machines. A bilevel positive airway pressure machine has two types of air pressureâone for breathing in and one for breathing out. Another device raises or lowers air pressure as needed while you breathe. · If your nose feels dry or bleeds when using one of these machines, talk with your doctor about increasing moisture in the air. A humidifier may help.   · If your nose is runny or stuffy from using a breathing machine, talk with your doctor about using decongestants or a corticosteroid nasal spray. When should you call for help? Watch closely for changes in your health, and be sure to contact your doctor if:  · You still have sleep apnea even though you have made lifestyle changes. · You are thinking of trying a device such as CPAP. · You are having problems using a CPAP or similar machine. Where can you learn more? Go to [employer]. Enter K680 in the search box to learn more about \"Sleep Apnea: After Your Visit. \"   © 6855-3473 Healthwise, Incorporated. Care instructions adapted under license by [employer] (which disclaims liability or warranty for this information). This care instruction is for use with your licensed healthcare professional. If you have questions about a medical condition or this instruction, always ask your healthcare professional. Mikey Salcedo any warranty or liability for your use of this information. PROPER SLEEP HYGIENE    What to avoid  · Do not have drinks with caffeine, such as coffee or black tea, for 8 hours before bed. · Do not smoke or use other types of tobacco near bedtime. Nicotine is a stimulant and can keep you awake. · Avoid drinking alcohol late in the evening, because it can cause you to wake in the middle of the night. · Do not eat a big meal close to bedtime. If you are hungry, eat a light snack. · Do not drink a lot of water close to bedtime, because the need to urinate may wake you up during the night. · Do not read or watch TV in bed. Use the bed only for sleeping and sexual activity. What to try  · Go to bed at the same time every night, and wake up at the same time every morning. Do not take naps during the day. · Keep your bedroom quiet, dark, and cool. · Get regular exercise, but not within 3 to 4 hours of your bedtime. .  · Sleep on a comfortable pillow and mattress.   · If watching the clock makes you anxious, turn it facing away from you so you cannot see the time. · If you worry when you lie down, start a worry book. Well before bedtime, write down your worries, and then set the book and your concerns aside. · Try meditation or other relaxation techniques before you go to bed. · If you cannot fall asleep, get up and go to another room until you feel sleepy. Do something relaxing. Repeat your bedtime routine before you go to bed again. · Make your house quiet and calm about an hour before bedtime. Turn down the lights, turn off the TV, log off the computer, and turn down the volume on music. This can help you relax after a busy day. Drowsy Driving  The [address] [place] [address] Road Traffic Safety Administration cites drowsiness as a causing factor in more than 418,370 police reported crashes annually, resulting in 76,000 injuries and 1,500 deaths. Other surveys suggest 55% of people polled have driven while drowsy in the past year, 23% had fallen asleep but not crashed, 3% crashed, and 2% had and accident due to drowsy driving. Who is at risk? Young Drivers: One study of drowsy driving accidents states that 55% of the drivers were under 25 years. Of those, 75% were male. Shift Workers and Travelers: People who work overnight or travel across time zones frequently are at higher risk of experiencing Circadian Rhythm Disorders. They are trying to work and function when their body is programed to sleep. Sleep Deprived: Lack of sleep has a serious impact on your ability to pay attention or focus on a task. Consistently getting less than the average of 8 hours your body needs creates partial or cumulative sleep deprivation. Untreated Sleep Disorders: Sleep Apnea, Narcolepsy, R.L.S., and other sleep disorders (untreated) prevent a person from getting enough restful sleep. This leads to excessive daytime sleepiness and increases the risk for drowsy driving accidents by up to 7 times.   Medications / Alcohol: Even over the counter medications can cause drowsiness. Medications that impair a drivers attention should have a warning label. Alcohol naturally makes you sleepy and on its own can cause accidents. Combined with excessive drowsiness its effects are amplified. Signs of Drowsy Driving:   * You don't remember driving the last few miles   * You may drift out of your hugh   * You are unable to focus and your thoughts wander   * You may yawn more often than normal   * You have difficulty keeping your eyes open / nodding off   * Missing traffic signs, speeding, or tailgating  Prevention-   Good sleep hygiene, lifestyle and behavioral choices have the most impact on drowsy driving. There is no substitute for sleep and the average person requires 8 hours nightly. If you find yourself driving drowsy, stop and sleep. Consider the sleep hygiene tips provided during your visit as well. Medication Refill Policy: Refills for all medications require 1 week advance notice. Please have your pharmacy fax a refill request. We are unable to fax, or call in \"controled substance\" medications and you will need to pick these prescriptions up from our office. Adaptly Activation    Thank you for requesting access to Adaptly. Please follow the instructions below to securely access and download your online medical record. Adaptly allows you to send messages to your doctor, view your test results, renew your prescriptions, schedule appointments, and more. How Do I Sign Up? 1. In your internet browser, go to [email or web address]. 2. Click on the First Time User? Click Here link in the Sign In box. You will see the New Member Sign Up page. 3. Enter your Adaptly Access Code exactly as it appears below. You will not need to use this code after youve completed the sign-up process. If you do not sign up before the expiration date, you must request a new code.     Adaptly Access Code: [email or web address]  Expires: 3/28/2018  3:46 PM (This is the date your Adsame access code will )    4. Enter the last four digits of your Social Security Number (xxxx) and Date of Birth (mm/dd/yyyy) as indicated and click Submit. You will be taken to the next sign-up page. 5. Create a InVitaet ID. This will be your Adsame login ID and cannot be changed, so think of one that is secure and easy to remember. 6. Create a Adsame password. You can change your password at any time. 7. Enter your Password Reset Question and Answer. This can be used at a later time if you forget your password. 8. Enter your e-mail address. You will receive e-mail notification when new information is available in [address] 9. Click Sign Up. You can now view and download portions of your medical record. 10. Click the Download Summary menu link to download a portable copy of your medical information. Additional Information    If you have questions, please call [phone]. Remember, Adsame is NOT to be used for urgent needs. For medical emergencies, dial 911.

## 2018-01-03 ENCOUNTER — DOCUMENTATION ONLY (OUTPATIENT)
Dept: SLEEP MEDICINE | Age: 43
End: 2018-01-03

## 2018-01-04 ENCOUNTER — TELEPHONE (OUTPATIENT)
Dept: SLEEP MEDICINE | Age: 43
End: 2018-01-04

## 2018-01-04 DIAGNOSIS — G47.10 HYPERSOMNIA: Primary | ICD-10-CM

## 2018-01-04 DIAGNOSIS — G47.419 PRIMARY NARCOLEPSY WITHOUT CATAPLEXY: ICD-10-CM

## 2018-01-04 NOTE — TELEPHONE ENCOUNTER
Results of sleep study in [employer]  Lead tech to convey results to patient    Results of HSAT in [employer]    The home sleep apnea test showed AHI - 1.hour. THe lowest oxygen saturation was 86%. This correlates with a test that is negative for significant sleep apnea. We had discussed treatment plan at initial consultation. Based on the results of the home sleep apnea test, APAP is not indicated at this time. As discussed at our visit, we should proceed with the evlauation for hypersomnia/narcolepsy now that sleep apnea has been ruled out. Optimizing sleep habits by keeping bedtime and waketime regular; ensuring sufficient total sleep time; avoiding caffeine after 2 pm; avoid looking at the clock during the night. Ideally, the clock face should be turned away. A regular exercise schedule, at least 3 hours before bedtime, would be beneficial to improving sleep quality. avoid evening light and to use sunglasses in the late afternoon. Watching TV, using laptops, tablets and smartphones in the evening was discouraged. keep the bedroom cool and dark. Pets should not be allowed to sleep in the bed.       Staff to schedule psg/mslt, she should hold her ritalin 2 days before her test and have someone drive her to her testing

## 2018-01-04 NOTE — TELEPHONE ENCOUNTER
HSAT Returned    Date of Study: 1/2/2018    The following information was gathered from the patients study log:    · Lights off: 10:30 PM  · Estimated sleep onset: 10:45 PM    · Awakened a total of ? times  · The patient felt they slept 7 hours  · Patient took nothing before starting the test  · Sleep quality was same compared to a usual nights sleep. Further information provided: Very restless, got hot and recovering from a head cold.

## 2018-01-04 NOTE — TELEPHONE ENCOUNTER
----- Message from Zoe Majeste sent at 1/2/2018 11:50 AM EST -----  Regarding: HSAT P2P  Good Morning    This patient's HSAT need additional information. P2P can be completed by calling [facility].     [phone]; options 1, 2, 2

## 2018-01-05 NOTE — TELEPHONE ENCOUNTER
Reviewed sleep study results with patient. She expressed understanding and is willing to proceed with an evlauation for hypersomnia/narcolepsy now that sleep apnea has been ruled out.

## 2018-02-01 ENCOUNTER — OFFICE VISIT (OUTPATIENT)
Dept: INTERNAL MEDICINE CLINIC | Age: 43
End: 2018-02-01

## 2018-02-01 VITALS
SYSTOLIC BLOOD PRESSURE: 108 MMHG | OXYGEN SATURATION: 98 % | HEIGHT: 61 IN | RESPIRATION RATE: 16 BRPM | DIASTOLIC BLOOD PRESSURE: 76 MMHG | TEMPERATURE: 98.2 F | BODY MASS INDEX: 29.07 KG/M2 | HEART RATE: 84 BPM | WEIGHT: 154 LBS

## 2018-02-01 DIAGNOSIS — J40 BRONCHITIS: Primary | ICD-10-CM

## 2018-02-01 RX ORDER — ALBUTEROL SULFATE 90 UG/1
1 AEROSOL, METERED RESPIRATORY (INHALATION)
Qty: 1 INHALER | Refills: 2 | Status: SHIPPED | OUTPATIENT
Start: 2018-02-01 | End: 2019-10-23 | Stop reason: SDUPTHER

## 2018-02-01 RX ORDER — METHYLPREDNISOLONE 4 MG/1
TABLET ORAL
Qty: 1 DOSE PACK | Refills: 0 | Status: SHIPPED | OUTPATIENT
Start: 2018-02-01 | End: 2018-02-07

## 2018-02-01 RX ORDER — BENZONATATE 200 MG/1
200 CAPSULE ORAL
Qty: 30 CAP | Refills: 0 | Status: SHIPPED | OUTPATIENT
Start: 2018-02-01 | End: 2018-02-08

## 2018-02-01 NOTE — MR AVS SNAPSHOT
[address] 
[phone] Patient: Saleem Lennon MRN: [id number] AOY:[birth year] Visit Information Date & Time Provider Department Dept. Phone Encounter #  
 2/1/2018 11:15 AM Cynthia Stephens, [facility] [phone] Follow-up Instructions Return if symptoms worsen or fail to improve. Upcoming Health Maintenance Date Due  
 BREAST CANCER SCRN MAMMOGRAM 1/27/2018 PAP AKA CERVICAL CYTOLOGY 1/21/2019 DTaP/Tdap/Td series (2 - Td) 6/19/2027 Allergies as of 2/1/2018  Review Complete On: 2/1/2018 By: Cynthia Stephens MD  
  
 Severity Noted Reaction Type Reactions Wheat  12/28/2017    Swelling Current Immunizations  Reviewed on 2/1/2018 Name Date Influenza Vaccine 10/1/2016 Influenza Vaccine (Quad) PF 11/7/2017 12:00 AM  
 Tdap 6/19/2017 Reviewed by Colby Juan RN on 2/1/2018 at 11:17 AM  
You Were Diagnosed With   
  
 Codes Comments Bronchitis    -  Primary ICD-10-CM: K90 ICD-9-CM: 589 Vitals BP Pulse Temp Resp Height(growth percentile) Weight(growth percentile) 108/76 84 98.2 °F (36.8 °C) (Oral) 16 5' 1.4\" (1.56 m) 154 lb (69.9 kg) LMP SpO2 BMI OB Status Smoking Status 01/31/2018 (Exact Date) 98% 28.72 kg/m2 Having regular periods Never Smoker BMI and BSA Data Body Mass Index Body Surface Area 28.72 kg/m 2 1.74 m 2 Preferred Pharmacy Pharmacy Name Phone [phone] [facility] [phone] Your Updated Medication List  
  
   
This list is accurate as of: 2/1/18 11:35 AM.  Always use your most recent med list.  
  
  
  
  
 albuterol 90 mcg/actuation inhaler Commonly known as:  PROVENTIL HFA, VENTOLIN HFA, PROAIR HFA Take 1 Puff by inhalation every six (6) hours as needed for Wheezing. benzonatate 200 mg capsule Commonly known as:  TESSALON Take 1 Cap by mouth three (3) times daily as needed for Cough for up to 7 days. ESTRADIOL-NORGESTIMATE PO Take  by mouth. FLUoxetine 20 mg capsule Commonly known as:  PROzac Take  by mouth daily. metFORMIN 500 mg tablet Commonly known as:  GLUCOPHAGE Take 2,000 mg by mouth daily. methylPREDNISolone 4 mg tablet Commonly known as:  MEDROL DOSEPACK  
use as directed RITALIN 5 mg tablet Generic drug:  methylphenidate HCl Take 5 mg by mouth three (3) times daily. VITAMIN B-12 1,000 mcg tablet Generic drug:  cyanocobalamin Take 1,000 mcg by mouth daily. VITAMIN C PO Take 1,000 mg by mouth daily. VITAMIN D3 PO Take 2,000 Units by mouth daily. WELLBUTRIN PO Take 75 mg by mouth daily. ZyrTEC 10 mg Cap Generic drug:  Cetirizine Take  by mouth daily. Prescriptions Sent to Pharmacy Refills  
 methylPREDNISolone (MEDROL DOSEPACK) 4 mg tablet 0 Sig: use as directed Class: Normal  
 Pharmacy: [facility] [address] [place] [address] Ph #: [phone]  
 benzonatate (TESSALON) 200 mg capsule 0 Sig: Take 1 Cap by mouth three (3) times daily as needed for Cough for up to 7 days. Class: Normal  
 Pharmacy: [address] Ph #: [phone] Route: Oral  
 albuterol (PROVENTIL HFA, VENTOLIN HFA, PROAIR HFA) 90 mcg/actuation inhaler 2 Sig: Take 1 Puff by inhalation every six (6) hours as needed for Wheezing. Class: Normal  
 Pharmacy: [address] Ph #: [phone] Route: Inhalation Follow-up Instructions Return if symptoms worsen or fail to improve.   
  
To-Do List   
 03/04/2018 9:30 PM  
 Appointment with [address] 4 [facility] at [facility]EP [facility] 1 Lora Sheldon ([phone]) 1. Do not take a nap the day of the study  2. No caffeine after 12 noon the day of the study  3. Bring a 2 piece sleeping garment  4. Hair should be clean and dry, no oils, sprays, powders and remove wigs, weaves or other hair accessories  6. Patient should eat dinner prior to arriving for the test and a light breakfast will be provided upon discharge in the morning  7. Patient encouraged to bring activity items such as books, magazines, laptop, IPAD, etc, as well as toiletry items needed for the next morning  8. Bring all medications with you to the center  9. For specific questions please contact the sleep center directly, [phone] to 5p  10. Do not arrive more than 15 minutes prior to appt time and use the doorbell to enter the sleep center. 03/05/2018 7:00 AM  
  Appointment with [facility][phone][facility] 1. MSLT is scheduled to immediatly follow an overnight attended sleep study. 2. Bring regular clothes to change into upon awakening in the morning. Not pajamas. 3. No caffeine after 12 noon the day prior to study. 4. Hair should be clean and dry, no oils, sprays, powders and remove wigs, weaves and other hair accessories. 5. Patient may bring books, magazines, tablet, etc. and any  toiletry items needed for the next morning. 6. Bring all medications with you to the sleep center. 7. Patient should be prepared to stay at the sleep center for the entire study. Discharge is approximately 5:00pm.  8. For specific questions, please contact the sleep center directly between 8:30am-5:00pm.   
  
  
Patient Instructions Bronchitis: Care Instructions Your Care Instructions Bronchitis is inflammation of the bronchial tubes, which carry air to the lungs. The tubes swell and produce mucus, or phlegm. The mucus and inflamed bronchial tubes make you cough. You may have trouble breathing. Most cases of bronchitis are caused by viruses like those that cause colds. Antibiotics usually do not help and they may be harmful. Bronchitis usually develops rapidly and lasts about 2 to 3 weeks in otherwise healthy people. Follow-up care is a key part of your treatment and safety. Be sure to make and go to all appointments, and call your doctor if you are having problems. It's also a good idea to know your test results and keep a list of the medicines you take. How can you care for yourself at home? · Take all medicines exactly as prescribed. Call your doctor if you think you are having a problem with your medicine. · Get some extra rest. 
· Take an over-the-counter pain medicine, such as acetaminophen (Tylenol), ibuprofen (Advil, Motrin), or naproxen (Aleve) to reduce fever and relieve body aches. Read and follow all instructions on the label. · Do not take two or more pain medicines at the same time unless the doctor told you to. Many pain medicines have acetaminophen, which is Tylenol. Too much acetaminophen (Tylenol) can be harmful. · Take an over-the-counter cough medicine that contains dextromethorphan to help quiet a dry, hacking cough so that you can sleep. Avoid cough medicines that have more than one active ingredient. Read and follow all instructions on the label. · Breathe moist air from a humidifier, hot shower, or sink filled with hot water. The heat and moisture will thin mucus so you can cough it out. · Do not smoke. Smoking can make bronchitis worse. If you need help quitting, talk to your doctor about stop-smoking programs and medicines. These can increase your chances of quitting for good. When should you call for help? Call 911 anytime you think you may need emergency care. For example, call if: 
? · You have severe trouble breathing. ?Call your doctor now or seek immediate medical care if: 
? · You have new or worse trouble breathing. ? · You cough up dark brown or bloody mucus (sputum). ? · You have a new or higher fever. ? · You have a new rash. ? Watch closely for changes in your health, and be sure to contact your doctor if: 
? · You cough more deeply or more often, especially if you notice more mucus or a change in the color of your mucus. ? · You are not getting better as expected. Where can you learn more? Go to http://zaire-bebe.info/. Enter H333 in the search box to learn more about \"Bronchitis: Care Instructions. \" Current as of: May 12, 2017 Content Version: 11.4 © 2316-2165 [employer]. Care instructions adapted under license by [employer] (which disclaims liability or warranty for this information). If you have questions about a medical condition or this instruction, always ask your healthcare professional. [address] any warranty or liability for your use of this information. Introducing [facility]! Ilan Alegria introduces FuturestateIT patient portal. Now you can access parts of your medical record, email your doctor's office, and request medication refills online. 1. In your internet browser, go to [email or web address] 2. Click on the First Time User? Click Here link in the Sign In box. You will see the New Member Sign Up page. 3. Enter your FuturestateIT Access Code exactly as it appears below. You will not need to use this code after youve completed the sign-up process. If you do not sign up before the expiration date, you must request a new code. · FuturestateIT Access Code: [email or web address] Expires: 3/28/2018  3:46 PM 
 
4. Enter the last four digits of your Social Security Number (xxxx) and Date of Birth (mm/dd/yyyy) as indicated and click Submit. You will be taken to the next sign-up page. 5. Create a FuturestateIT ID.  This will be your FuturestateIT login ID and cannot be changed, so think of one that is secure and easy to remember. 6. Create a Equigerminal password. You can change your password at any time. 7. Enter your Password Reset Question and Answer. This can be used at a later time if you forget your password. 8. Enter your e-mail address. You will receive e-mail notification when new information is available in [address] 9. Click Sign Up. You can now view and download portions of your medical record. 10. Click the Download Summary menu link to download a portable copy of your medical information. If you have questions, please visit the Frequently Asked Questions section of the Equigerminal website. Remember, Equigerminal is NOT to be used for urgent needs. For medical emergencies, dial 911. Now available from your iPhone and Android! Please provide this summary of care documentation to your next provider. Your primary care clinician is listed as Nehemias Lujan. If you have any questions after today's visit, please call [phone].

## 2018-02-01 NOTE — PROGRESS NOTES
Evelia Lennon is a 36 y.o. female who was seen in clinic today (2/1/2018). Assessment & Plan:   Diagnoses and all orders for this visit:    1. Bronchitis- discussed diagnosis & treatment options, discussed allergic vs viral vs bacterial etiologies and at this time favor a viral etiology, reviewed the importance of avoiding unnecessary antibiotic therapy, will start on meds below, reviewed which OTC medications to use and avoid, expected time course for resolution & red flags were reviewed with her to RTC or notify me. If no changes w/ treatment below will start on abx. She will update me in 3-5 days. -     methylPREDNISolone (MEDROL DOSEPACK) 4 mg tablet; use as directed  -     benzonatate (TESSALON) 200 mg capsule; Take 1 Cap by mouth three (3) times daily as needed for Cough for up to 7 days. -     albuterol (PROVENTIL HFA, VENTOLIN HFA, PROAIR HFA) 90 mcg/actuation inhaler; Take 1 Puff by inhalation every six (6) hours as needed for Wheezing. Follow-up Disposition:  Return if symptoms worsen or fail to improve. Subjective:   Tye Castro was seen today for Cough    URI Review  Tye Castro returns to clinic today to talk about: cough for 6 weeks ago, which is fluctuating since that time. Cough is mostly dry and worse in the AM.  She reports for the last few days post nasal drip, bilateral ear pain/fullness and wheezing (on/off). She denies a history of: fever, chills, sinus congestion, chest congestion and SOB/RAMAN. Treatments have included: benzonatate (has not been very effective) and albuterol (temporarily effective). Relevant PMH: allergic rhinitis. Patient reports sick contacts: yes - son had similar issues. She went to  when this started, given the cough medications, but did not help. Prior to Admission medications    Medication Sig Start Date End Date Taking? Authorizing Provider   methylphenidate (RITALIN) 5 mg tablet Take 5 mg by mouth three (3) times daily.    Yes Historical Provider BUPROPION HCL (WELLBUTRIN PO) Take 75 mg by mouth daily. Yes Historical Provider   ESTRADIOL-NORGESTIMATE PO Take  by mouth. Yes Historical Provider   FLUoxetine (PROZAC) 20 mg capsule Take  by mouth daily. Yes Historical Provider   metFORMIN (GLUCOPHAGE) 500 mg tablet Take 2,000 mg by mouth daily. Yes Historical Provider   ASCORBATE CALCIUM (VITAMIN C PO) Take 1,000 mg by mouth daily. Yes Historical Provider   CHOLECALCIFEROL, VITAMIN D3, (VITAMIN D3 PO) Take 2,000 Units by mouth daily. Yes Historical Provider   cyanocobalamin (VITAMIN B-12) 1,000 mcg tablet Take 1,000 mcg by mouth daily. Yes Historical Provider   Cetirizine (ZYRTEC) 10 mg cap Take  by mouth daily. Yes Historical Provider   albuterol (PROVENTIL HFA, VENTOLIN HFA, PROAIR HFA) 90 mcg/actuation inhaler Take 1 Puff by inhalation every six (6) hours as needed for Wheezing. 12/19/16  Yes Tanika Delcid MD          Allergies   Allergen Reactions    Wheat Swelling           ROS - per HPI        Objective:   Physical Exam   Constitutional: No distress. HENT:   Right Ear: Tympanic membrane is scarred. Tympanic membrane is not erythematous and not bulging. No middle ear effusion. Left Ear: Tympanic membrane is scarred. Tympanic membrane is not erythematous and not bulging. No middle ear effusion. Nose: No mucosal edema or rhinorrhea. Right sinus exhibits no maxillary sinus tenderness and no frontal sinus tenderness. Left sinus exhibits no maxillary sinus tenderness and no frontal sinus tenderness. Mouth/Throat: Uvula is midline and mucous membranes are normal. Posterior oropharyngeal erythema present. No oropharyngeal exudate. Eyes: Conjunctivae are normal. No scleral icterus. Neck: Neck supple. Cardiovascular: Regular rhythm and normal heart sounds. No murmur heard. Pulmonary/Chest: Effort normal and breath sounds normal. She has no wheezes. She has no rales. Lymphadenopathy:     She has no cervical adenopathy. Visit Vitals    /76    Pulse 84    Temp 98.2 °F (36.8 °C) (Oral)    Resp 16    Ht 5' 1.4\" (1.56 m)    Wt 154 lb (69.9 kg)    LMP 01/31/2018 (Exact Date)    SpO2 98%    BMI 28.72 kg/m2         Disclaimer:  Advised her to call back or return to office if symptoms worsen/change/persist.  Discussed expected course/resolution/complications of diagnosis in detail with patient. Medication risks/benefits/costs/interactions/alternatives discussed with patient. She was given an after visit summary which includes diagnoses, current medications, & vitals. She expressed understanding with the diagnosis and plan. Aspects of this note may have been generated using voice recognition software. Despite editing, there may be some syntax errors.        Nelida Angel MD

## 2018-02-01 NOTE — PATIENT INSTRUCTIONS
Bronchitis: Care Instructions  Your Care Instructions    Bronchitis is inflammation of the bronchial tubes, which carry air to the lungs. The tubes swell and produce mucus, or phlegm. The mucus and inflamed bronchial tubes make you cough. You may have trouble breathing. Most cases of bronchitis are caused by viruses like those that cause colds. Antibiotics usually do not help and they may be harmful. Bronchitis usually develops rapidly and lasts about 2 to 3 weeks in otherwise healthy people. Follow-up care is a key part of your treatment and safety. Be sure to make and go to all appointments, and call your doctor if you are having problems. It's also a good idea to know your test results and keep a list of the medicines you take. How can you care for yourself at home? · Take all medicines exactly as prescribed. Call your doctor if you think you are having a problem with your medicine. · Get some extra rest.  · Take an over-the-counter pain medicine, such as acetaminophen (Tylenol), ibuprofen (Advil, Motrin), or naproxen (Aleve) to reduce fever and relieve body aches. Read and follow all instructions on the label. · Do not take two or more pain medicines at the same time unless the doctor told you to. Many pain medicines have acetaminophen, which is Tylenol. Too much acetaminophen (Tylenol) can be harmful. · Take an over-the-counter cough medicine that contains dextromethorphan to help quiet a dry, hacking cough so that you can sleep. Avoid cough medicines that have more than one active ingredient. Read and follow all instructions on the label. · Breathe moist air from a humidifier, hot shower, or sink filled with hot water. The heat and moisture will thin mucus so you can cough it out. · Do not smoke. Smoking can make bronchitis worse. If you need help quitting, talk to your doctor about stop-smoking programs and medicines. These can increase your chances of quitting for good.   When should you call for help? Call 911 anytime you think you may need emergency care. For example, call if:  ? · You have severe trouble breathing. ?Call your doctor now or seek immediate medical care if:  ? · You have new or worse trouble breathing. ? · You cough up dark brown or bloody mucus (sputum). ? · You have a new or higher fever. ? · You have a new rash. ? Watch closely for changes in your health, and be sure to contact your doctor if:  ? · You cough more deeply or more often, especially if you notice more mucus or a change in the color of your mucus. ? · You are not getting better as expected. Where can you learn more? Go to http://zaire-bebe.info/. Enter H333 in the search box to learn more about \"Bronchitis: Care Instructions. \"  Current as of: May 12, 2017  Content Version: 11.4  © 6727-4099 [employer]. Care instructions adapted under license by [employer] (which disclaims liability or warranty for this information). If you have questions about a medical condition or this instruction, always ask your healthcare professional. [address] any warranty or liability for your use of this information.

## 2018-03-04 ENCOUNTER — HOSPITAL ENCOUNTER (OUTPATIENT)
Dept: SLEEP MEDICINE | Age: 43
Discharge: HOME | End: 2018-03-04
Payer: COMMERCIAL

## 2018-03-04 VITALS
BODY MASS INDEX: 29.19 KG/M2 | SYSTOLIC BLOOD PRESSURE: 117 MMHG | HEIGHT: 61 IN | OXYGEN SATURATION: 94 % | DIASTOLIC BLOOD PRESSURE: 75 MMHG | TEMPERATURE: 98.3 F | WEIGHT: 154.6 LBS

## 2018-03-04 DIAGNOSIS — G47.10 HYPERSOMNIA: ICD-10-CM

## 2018-03-04 DIAGNOSIS — G47.419 PRIMARY NARCOLEPSY WITHOUT CATAPLEXY: ICD-10-CM

## 2018-03-04 PROCEDURE — 95810 POLYSOM 6/> YRS 4/> PARAM: CPT | Performed by: INTERNAL MEDICINE

## 2018-03-05 ENCOUNTER — HOSPITAL ENCOUNTER (OUTPATIENT)
Dept: SLEEP MEDICINE | Age: 43
Discharge: HOME | End: 2018-03-05
Payer: COMMERCIAL

## 2018-03-05 DIAGNOSIS — G47.419 PRIMARY NARCOLEPSY WITHOUT CATAPLEXY: ICD-10-CM

## 2018-03-05 DIAGNOSIS — G47.10 HYPERSOMNIA: ICD-10-CM

## 2018-03-05 PROCEDURE — 95805 MULTIPLE SLEEP LATENCY TEST: CPT

## 2018-03-06 LAB
AMPHETAMINES UR QL SCN: NEGATIVE NG/ML
BARBITURATES UR QL SCN: NEGATIVE NG/ML
BENZODIAZ UR QL: NEGATIVE NG/ML
BZE UR QL: NEGATIVE NG/ML
CANNABINOIDS UR QL SCN: NEGATIVE NG/ML
DRUG SCREEN COMMENT:, 753798: NORMAL
OPIATES UR QL: NEGATIVE NG/ML
PCP UR QL: NEGATIVE NG/ML

## 2018-03-08 ENCOUNTER — TELEPHONE (OUTPATIENT)
Dept: SLEEP MEDICINE | Age: 43
End: 2018-03-08

## 2018-03-08 NOTE — TELEPHONE ENCOUNTER
Test results reviewed. Treatment options reviewed.  She will research and call back to see if wants to try provigil vs stick with ritalin

## 2018-04-13 ENCOUNTER — TELEPHONE (OUTPATIENT)
Dept: SLEEP MEDICINE | Age: 43
End: 2018-04-13

## 2018-04-13 DIAGNOSIS — G47.10 HYPERSOMNIA: Primary | ICD-10-CM

## 2018-04-13 RX ORDER — METHYLPHENIDATE HYDROCHLORIDE 5 MG/1
5 TABLET ORAL 3 TIMES DAILY
Qty: 90 TAB | Refills: 0 | Status: SHIPPED | OUTPATIENT
Start: 2018-04-13 | End: 2018-05-31 | Stop reason: SDUPTHER

## 2018-04-13 NOTE — TELEPHONE ENCOUNTER
She wants to continue her ritalin 5 mg TID. Last dose before 3 pm.  She will  on Monday.  Have her sign prescription agreement

## 2018-05-31 DIAGNOSIS — G47.10 HYPERSOMNIA: ICD-10-CM

## 2018-05-31 RX ORDER — METHYLPHENIDATE HYDROCHLORIDE 5 MG/1
5 TABLET ORAL 3 TIMES DAILY
Qty: 90 TAB | Refills: 0 | Status: SHIPPED | OUTPATIENT
Start: 2018-05-31 | End: 2018-07-19 | Stop reason: SDUPTHER

## 2018-06-28 ENCOUNTER — OFFICE VISIT (OUTPATIENT)
Dept: ENDOCRINOLOGY | Age: 43
End: 2018-06-28

## 2018-06-28 VITALS
DIASTOLIC BLOOD PRESSURE: 73 MMHG | HEIGHT: 61 IN | WEIGHT: 158 LBS | BODY MASS INDEX: 29.83 KG/M2 | SYSTOLIC BLOOD PRESSURE: 123 MMHG | HEART RATE: 93 BPM

## 2018-06-28 DIAGNOSIS — R68.89 COLD INTOLERANCE: ICD-10-CM

## 2018-06-28 DIAGNOSIS — K90.0 CELIAC DISEASE: ICD-10-CM

## 2018-06-28 DIAGNOSIS — F41.9 ANXIETY AND DEPRESSION: ICD-10-CM

## 2018-06-28 DIAGNOSIS — F32.A ANXIETY AND DEPRESSION: ICD-10-CM

## 2018-06-28 DIAGNOSIS — R53.82 CHRONIC FATIGUE: Primary | ICD-10-CM

## 2018-06-28 DIAGNOSIS — G47.00 INSOMNIA, UNSPECIFIED TYPE: ICD-10-CM

## 2018-06-28 DIAGNOSIS — R73.03 PRE-DIABETES: ICD-10-CM

## 2018-06-28 RX ORDER — ZINC GLUCONATE 100 MG
100 TABLET ORAL DAILY
COMMUNITY
End: 2019-03-20

## 2018-06-28 NOTE — PROGRESS NOTES
Chief Complaint   Patient presents with    Thyroid Problem    New Patient     History of Present Illness: Danielle Lennon is a 36 y.o. female presents for evaluation of Hashimoto's thyroiditis. Also has severe fatigue. She felt well until about 3-4 years ago. Started feeling poorly after her divorce and the severe stress she had. She has hypersomnia/chronic fatigue  Taking Ritalin for last 3-4 years to stay awake  Also taking bupoprion - for both mood and weight loss  Has been taking fluoxetine for many years. She has celiac disease and follows a gluten free diet. No gastrointestinal sx such as N/V. Does have some constipation. Taking OCP consistently. BMI 29 - highest was 165 four years ago. Now as low as 155. Wt was 100 lbs at age 22. Adult weight was then around 120 lbs. 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 Sleep has been very poor since time of severe stress. Diet:  - Breakfast: egg, sausage, hash brown on fajita (gluten free) OR yogurt. - Lunch: pre-made salad. - Dinner: chicken, pork, meat, vegetable and starch. - Beverages: water  - Snacks:   - feels very hungry between meals    Social:   for  - 2nd grade  She has a 15 yo son. Ex- - lives in [place]. Past Medical History:   Diagnosis Date    Anxiety 2016    Celiac disease 2016    Hypersomnia, idiopathic 2016    Perennial allergic rhinitis 2016    Pre-diabetes 2016     Past Surgical History:   Procedure Laterality Date    HX  SECTION  2004    HX  SECTION  10/2005    HX DILATION AND CURETTAGE  10/2003    HX ENDOSCOPY      per previous PCP records     Current Outpatient Prescriptions   Medication Sig    OTHER Birth control pill    OTHER Berberine    vitamin k 100 mcg tablet Take 100 mcg by mouth daily.  potassium/magnesium (MAGNESIUM-POTASSIUM PO) Take  by mouth.  methylphenidate HCl (RITALIN) 5 mg tablet Take 1 Tab (5 mg total) by mouth three (3) times dailyEarliest Fill Date: 5/31/18. Max Daily Amount: 15 mg    albuterol (PROVENTIL HFA, VENTOLIN HFA, PROAIR HFA) 90 mcg/actuation inhaler Take 1 Puff by inhalation every six (6) hours as needed for Wheezing.  BUPROPION HCL (WELLBUTRIN PO) Take 75 mg by mouth daily.  FLUoxetine (PROZAC) 20 mg capsule Take  by mouth daily.  metFORMIN (GLUCOPHAGE) 500 mg tablet Take 2,000 mg by mouth daily.  ASCORBATE CALCIUM (VITAMIN C PO) Take 1,000 mg by mouth daily.  CHOLECALCIFEROL, VITAMIN D3, (VITAMIN D3 PO) Take 2,000 Units by mouth daily.  cyanocobalamin (VITAMIN B-12) 1,000 mcg tablet Take 1,000 mcg by mouth daily.  Cetirizine (ZYRTEC) 10 mg cap Take  by mouth daily.  ESTRADIOL-NORGESTIMATE PO Take  by mouth. No current facility-administered medications for this visit. Allergies   Allergen Reactions    Wheat Swelling     Family History   Problem Relation Age of Onset    No Known Problems Mother     Diabetes Father      pre-diabetic    Hypertension Father     High Cholesterol Father     Heart Disease Father      s/p stents    Sleep Apnea Father     Stroke Maternal Grandmother     Osteoporosis Maternal Grandmother     Stroke Maternal Grandfather     Alcohol abuse Maternal Uncle     No Known Problems Brother     No Known Problems Brother     No Known Problems Son     Other Daughter      prader willi & CP     Social History     Social History    Marital status:      Spouse name: N/A    Number of children: N/A    Years of education: N/A     Occupational History    Not on file.      Social History Main Topics    Smoking status: Never Smoker    Smokeless tobacco: Never Used    Alcohol use Yes      Comment: occasionally    Drug use: No    Sexual activity: Yes     Partners: Male     Birth control/ protection: Condom, Pill     Other Topics Concern    Not on file     Social History Narrative       Review of Systems:  - Constitutional Symptoms: no fevers, chills, see HPI  - Eyes: no blurry vision or double vision  - Cardiovascular: no chest pain or palpitations  - Respiratory: no cough or shortness of breath  - Gastrointestinal: no dysphagia or abdominal pain  - Musculoskeletal: no joint pains or weakness  - Integumentary: no rashes  - Neurological: no numbness, tingling, or headaches  - Psychiatric: See HPI  - Endocrine: no heat or cold intolerance, no polyuria or polydipsia    Physical Examination:  Visit Vitals    /73    Pulse 93    Ht 5' 1\" (1.549 m)    Wt 158 lb (71.7 kg)    BMI 29.85 kg/m2   -   - General: pleasant, no distress,   - HEENT:No scleral/conjunctival injection, EOMI,  MMM  - Cardiovascular: regular, normal rate  - Respiratory: normal effort  - Integumentary:  no edema. No hyperpigmentation  - Neurological: alert  - Psychiatric: normal mood and affect    Data Reviewed:   Normal TSH. Hemoglobin A1c normal    Assessment/Plan:   1. Chronic fatigue   This is what she describes. Symptoms arose around the time of very stressful divorce as well as some job-related stress and financial stress. I suspect she is having a hard time pulling herself out of this. The poor sleep seems to be the main factor in regards to her fatigue. I think if her sleep improved, that her energy level would improve. Reviewed with her that the bupropion is associated with insomnia about one third of patients. It may also increase the potential negative effects of fluoxetine. May also increase fluoxetine levels. The bupropion was started help her with weight loss, but it did not have significant effect in that way. Recommended she discontinue the bupoprion and reassess her symptoms. Encouraged good sleep hygiene  Encouraged regular exercise as well as healthy diet. Encouraged to consider meditation.     For thoroughness, will check a morning cortisol level. I strongly doubt this will be of normal.  She does very little if no symptoms of adrenal insufficiency. She is has no hyperpigmentation or significant gastrointestinal symptoms. Blood pressure and glucose level are not significant. She is curious about whether she could have Hashimoto's thyroiditis. We will check this   2. Celiac disease   -Considering other autoimmune conditions being possible. 3. Pre-diabetes -appears to be controlled with metformin   4. BMI 29.0-29.9,adult   -Hopefully if she find a way to rest at night she may have the energy to start exercising regularly. Encouraged dietary efforts  I think her energy may improve if she does have success with weight loss   5. Insomnia, unspecified type   See above. Consider medication related side effects  Recommended she stop bupropion as the intended effect help with weight loss was very small, and she could potentially be having side effects  -See below. Consider seeing a psychiatrist to help with sleep and anxiety/depression   6. Cold intolerance   We will check ferritin level for completeness. My suspicion about iron deficiency is low due to her current lack of menses after hysterectomy. 7.      Anxiety/depression: Recommend she consider seeing a counselor to help her work through a very stressful period in her life. Also perhaps a psychiatrist or other mental health professional may have a better idea of which medications may work for her and also potentially help him sleep. Greater than 50% of 60 minute visit was spent counseling the patient about above. Patient Instructions   Chronic fatigue:    - poor sleep is likely major factor. May be exacerbated by medications (fluoxetine, bupoprion and Ritalin can all effect sleep).   Would stop bupoprion if it did not have the benefit you were hoping for, but may be exacerbating poor sleep    Aim for regular daily exercise  Get outside in bright sunlight during the day.       Can check AM cortisol to rule out adrenal insufficiency, will check for iron deficiency (ferritin) and Hashimoto's thyroiditis    Follow-up Disposition: Not on File    Copy sent to:

## 2018-06-28 NOTE — PROGRESS NOTES
Patient here to discuss pre-diabetes, weight gain, hair shedding, brittle nails, dry skin. Has history of celiac disease and idiopathic hypersomnia.

## 2018-06-28 NOTE — PATIENT INSTRUCTIONS
Chronic fatigue:    - poor sleep is likely major factor. May be exacerbated by medications (fluoxetine, bupoprion and Ritalin can all effect sleep). Would stop bupoprion if it did not have the benefit you were hoping for, but may be exacerbating poor sleep    Aim for regular daily exercise  Get outside in bright sunlight during the day.       Can check AM cortisol to rule out adrenal insufficiency, will check for iron deficiency (ferritin) and Hashimoto's thyroiditis

## 2018-06-28 NOTE — MR AVS SNAPSHOT
[address] [facility] [address] [facility] 
[phone] Patient: Meaghan Lennon MRN: [id number] Visit Information Date & Time Provider Department Dept. Phone Encounter #  
 6/28/2018 10:50 AM Marlo Seo, [address] [place] [address] Diabetes and Endocrinology [phone] Your Appointments 8/2/2018  9:00 AM  
Any with Justin Montemayor MD  
[address] [place] [address] [facility] Appt Note: med f/up [address] [facility] [phone] [address] [facility] [phone] Upcoming Health Maintenance Date Due  
 BREAST CANCER SCRN MAMMOGRAM 1/27/2018 Influenza Age 5 to Adult 8/1/2018 PAP AKA CERVICAL CYTOLOGY 1/21/2019 DTaP/Tdap/Td series (2 - Td) 6/19/2027 Allergies as of 6/28/2018  Review Complete On: 6/28/2018 By: Marlo Seo MD  
  
 Severity Noted Reaction Type Reactions Wheat  12/28/2017    Swelling Current Immunizations  Reviewed on 2/1/2018 Name Date Influenza Vaccine 10/1/2016 Influenza Vaccine (Quad) PF 11/7/2017 12:00 AM  
 Tdap 6/19/2017 Not reviewed this visit You Were Diagnosed With   
  
 Codes Comments Chronic fatigue    -  Primary ICD-10-CM: R53.82 
ICD-9-CM: 780.79 Celiac disease     ICD-10-CM: K90.0 ICD-9-CM: 579.0 Pre-diabetes     ICD-10-CM: R73.03 
ICD-9-CM: 790.29 BMI 29.0-29.9,adult     ICD-10-CM: C07.41 ICD-9-CM: V85.25 Insomnia, unspecified type     ICD-10-CM: G47.00 ICD-9-CM: 780.52 Cold intolerance     ICD-10-CM: R68.89 ICD-9-CM: 780.99 Vitals BP Pulse Height(growth percentile) Weight(growth percentile) BMI OB Status 123/73 93 5' 1\" (1.549 m) 158 lb (71.7 kg) 29.85 kg/m2 Having regular periods Smoking Status Never Smoker Vitals History BMI and BSA Data Body Mass Index Body Surface Area  
 29.85 kg/m 2 1.76 m 2 Preferred Pharmacy Pharmacy Name Phone Mag[facility] Morales, [facility] [phone] Your Updated Medication List  
  
   
This list is accurate as of 6/28/18 12:14 PM.  Always use your most recent med list.  
  
  
  
  
 albuterol 90 mcg/actuation inhaler Commonly known as:  PROVENTIL HFA, VENTOLIN HFA, PROAIR HFA Take 1 Puff by inhalation every six (6) hours as needed for Wheezing. FLUoxetine 20 mg capsule Commonly known as:  PROzac Take  by mouth daily. MAGNESIUM-POTASSIUM PO Take  by mouth.  
  
 metFORMIN 500 mg tablet Commonly known as:  GLUCOPHAGE Take 2,000 mg by mouth daily. methylphenidate HCl 5 mg tablet Commonly known as:  RITALIN Take 1 Tab (5 mg total) by mouth three (3) times dailyEarliest Fill Date: 5/31/18. Max Daily Amount: 15 mg  
  
 OTHER Birth control pill OTHER Berberine VITAMIN B-12 1,000 mcg tablet Generic drug:  cyanocobalamin Take 1,000 mcg by mouth daily. VITAMIN C PO Take 1,000 mg by mouth daily. VITAMIN D3 PO Take 2,000 Units by mouth daily. vitamin k 100 mcg tablet Take 100 mcg by mouth daily. WELLBUTRIN PO Take 75 mg by mouth daily. ZyrTEC 10 mg Cap Generic drug:  Cetirizine Take  by mouth daily. We Performed the Following CORTISOL, AM H925204 CPT(R)] FERRITIN [01264 CPT(R)] THYROID PEROXIDASE (TPO) AB [87038 CPT(R)] Patient Instructions Chronic fatigue: 
 
- poor sleep is likely major factor. May be exacerbated by medications (fluoxetine, bupoprion and Ritalin can all effect sleep). Would stop bupoprion if it did not have the benefit you were hoping for, but may be exacerbating poor sleep Aim for regular daily exercise Get outside in bright sunlight during the day. Can check AM cortisol to rule out adrenal insufficiency, will check for iron deficiency (ferritin) and Hashimoto's thyroiditis Introducing hospitals & HEALTH SERVICES! [employer] introduces zerobound patient portal. Now you can access parts of your medical record, email your doctor's office, and request medication refills online. 1. In your internet browser, go to [email or web address] 2. Click on the First Time User? Click Here link in the Sign In box. You will see the New Member Sign Up page. 3. Enter your zerobound Access Code exactly as it appears below. You will not need to use this code after youve completed the sign-up process. If you do not sign up before the expiration date, you must request a new code. · zerobound Access Code: [email or web address] Expires: 9/26/2018 12:14 PM 
 
4. Enter the last four digits of your Social Security Number (xxxx) and Date of Birth (mm/dd/yyyy) as indicated and click Submit. You will be taken to the next sign-up page. 5. Create a zerobound ID. This will be your zerobound login ID and cannot be changed, so think of one that is secure and easy to remember. 6. Create a zerobound password. You can change your password at any time. 7. Enter your Password Reset Question and Answer. This can be used at a later time if you forget your password. 8. Enter your e-mail address. You will receive e-mail notification when new information is available in [address] 9. Click Sign Up. You can now view and download portions of your medical record. 10. Click the Download Summary menu link to download a portable copy of your medical information. If you have questions, please visit the Frequently Asked Questions section of the zerobound website. Remember, zerobound is NOT to be used for urgent needs. For medical emergencies, dial 911. Now available from your iPhone and Android! Please provide this summary of care documentation to your next provider. Your primary care clinician is listed as Collette Beverage. If you have any questions after today's visit, please call [phone].

## 2018-06-30 LAB
CORTIS AM PEAK SERPL-MCNC: 34.4 UG/DL (ref 6.2–19.4)
FERRITIN SERPL-MCNC: 17 NG/ML (ref 15–150)
THYROPEROXIDASE AB SERPL-ACNC: 14 IU/ML (ref 0–34)

## 2018-07-01 NOTE — PROGRESS NOTES
Will recommend iron supplementation  Cortisol is not low, ruling out adrenal insufficiency. Cortisol is likely high due to her current use of OCP use. If she is concerned about excess cortisol, she can do a 24-hour urine free cortisol test.  Ferritin is lower than optimal.  Will recommend iron supplementation  Tracee Ray,  Please call and mail lab letter. Thank you.

## 2018-07-16 ENCOUNTER — TELEPHONE (OUTPATIENT)
Dept: SLEEP MEDICINE | Age: 43
End: 2018-07-16

## 2018-07-16 DIAGNOSIS — G47.10 HYPERSOMNIA: ICD-10-CM

## 2018-07-19 RX ORDER — METHYLPHENIDATE HYDROCHLORIDE 5 MG/1
5 TABLET ORAL 3 TIMES DAILY
Qty: 90 TAB | Refills: 0 | Status: SHIPPED | OUTPATIENT
Start: 2018-07-19 | End: 2018-08-02 | Stop reason: SDUPTHER

## 2018-08-02 ENCOUNTER — OFFICE VISIT (OUTPATIENT)
Dept: SLEEP MEDICINE | Age: 43
End: 2018-08-02

## 2018-08-02 VITALS
OXYGEN SATURATION: 96 % | WEIGHT: 157 LBS | BODY MASS INDEX: 29.64 KG/M2 | HEIGHT: 61 IN | HEART RATE: 84 BPM | SYSTOLIC BLOOD PRESSURE: 107 MMHG | DIASTOLIC BLOOD PRESSURE: 73 MMHG

## 2018-08-02 DIAGNOSIS — G47.10 HYPERSOMNIA: ICD-10-CM

## 2018-08-02 RX ORDER — METHYLPHENIDATE HYDROCHLORIDE 5 MG/1
5 TABLET ORAL 3 TIMES DAILY
Qty: 90 TAB | Refills: 0 | Status: SHIPPED | OUTPATIENT
Start: 2018-10-02 | End: 2019-02-04 | Stop reason: SDUPTHER

## 2018-08-02 RX ORDER — METHYLPHENIDATE HYDROCHLORIDE 5 MG/1
5 TABLET ORAL 3 TIMES DAILY
Qty: 90 TAB | Refills: 0 | Status: SHIPPED | OUTPATIENT
Start: 2018-08-02 | End: 2019-02-04 | Stop reason: SDUPTHER

## 2018-08-02 RX ORDER — METHYLPHENIDATE HYDROCHLORIDE 5 MG/1
5 TABLET ORAL 3 TIMES DAILY
Qty: 90 TAB | Refills: 0 | Status: SHIPPED | OUTPATIENT
Start: 2018-09-02 | End: 2019-02-04 | Stop reason: SDUPTHER

## 2018-08-02 RX ORDER — LEVONORGESTREL AND ETHINYL ESTRADIOL 0.15-0.03
KIT ORAL
COMMUNITY
Start: 2018-07-26 | End: 2019-03-20

## 2018-08-02 RX ORDER — DEXAMETHASONE 1 MG/1
TABLET ORAL
COMMUNITY
Start: 2018-07-16 | End: 2019-03-20

## 2018-08-02 NOTE — PROGRESS NOTES
[address] [facility]. [place], [address]  Tel.  [phone]  Fax. 100 [facility] 60  [place], [address] [facility]  Tel.  [phone]  Fax. [phone] 9[address] [facility] Linh Sepulveda   Tel.  [phone]  Fax. [phone]     S>Zofia Lennon is a 36 y.o. female seen for Sleep Problem (med f/up)  she is doing well. Taking ritalin only twice daily now. 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 She was found to be iron deficient and iron therapy has helped her feel better. She thinks she is sleeping better too (she is not taking the 2 pm dosage of ritalin now)      Allergies   Allergen Reactions    Wheat Swelling       She has a current medication list which includes the following prescription(s): dexamethasone, kurvelo, methylphenidate hcl, methylphenidate hcl, methylphenidate hcl, OTHER, OTHER, vitamin k, potassium/magnesium, albuterol, bupropion hcl, fluoxetine, metformin, ascorbate calcium, cholecalciferol (vitamin d3), cyanocobalamin, and cetirizine. .      She  has a past medical history of Anxiety (12/19/2016); Celiac disease (12/19/2016); Hypersomnia, idiopathic (12/19/2016); Perennial allergic rhinitis (12/19/2016); and Pre-diabetes (12/19/2016). [place] Sleepiness Score: 13   and Modified F.O.S.Q. Score Total / 2: 17      O>    Visit Vitals    /73    Pulse 84    Ht 5' 1\" (1.549 m)    Wt 157 lb (71.2 kg)    SpO2 96%    BMI 29.66 kg/m2           General:   Alert, oriented, not in distress   Neck:   No JVD    Chest/Lungs:  symetrical lung expansion , no accessory muscle use    Extremities:  no obvious rashes , negative edema    Neuro:  No focal deficits ; No obvious tremor    Psych:  Normal affect ,  Normal countenance ;       A>    ICD-10-CM ICD-9-CM    1.  Hypersomnia G47.10 780.54 methylphenidate HCl (RITALIN) 5 mg tablet      methylphenidate HCl (RITALIN) 5 mg tablet      methylphenidate HCl (RITALIN) 5 mg tablet           P>    She will continue the ritalin at 2-3 times daily and not to exceed that. Prescriptions renewed. Proper sleep hygiene and safe driving were reviewed today and all of her questions were addressed.   Electronically signed by    Ismael Belle MD  Diplomate in Sleep Medicine  [facility]

## 2018-08-02 NOTE — PATIENT INSTRUCTIONS
[address] [facility] [address] [place], [address]  Tel.  [phone]  Fax. 100 [facility] 60  1001 [facility] [address], [address]  Tel.  [phone]  Fax. [phone] [address] [facility] [address]  Tel.  [phone]  Fax. [phone]     PROPER SLEEP HYGIENE    What to avoid  · Do not have drinks with caffeine, such as coffee or black tea, for 8 hours before bed. · Do not smoke or use other types of tobacco near bedtime. Nicotine is a stimulant and can keep you awake. · Avoid drinking alcohol late in the evening, because it can cause you to wake in the middle of the night. · Do not eat a big meal close to bedtime. If you are hungry, eat a light snack. · Do not drink a lot of water close to bedtime, because the need to urinate may wake you up during the night. · Do not read or watch TV in bed. Use the bed only for sleeping and sexual activity. What to try  · Go to bed at the same time every night, and wake up at the same time every morning. Do not take naps during the day. · Keep your bedroom quiet, dark, and cool. · Get regular exercise, but not within 3 to 4 hours of your bedtime. .  · Sleep on a comfortable pillow and mattress. · If watching the clock makes you anxious, turn it facing away from you so you cannot see the time. · If you worry when you lie down, start a worry book. Well before bedtime, write down your worries, and then set the book and your concerns aside. · Try meditation or other relaxation techniques before you go to bed. · If you cannot fall asleep, get up and go to another room until you feel sleepy. Do something relaxing. Repeat your bedtime routine before you go to bed again. · Make your house quiet and calm about an hour before bedtime. Turn down the lights, turn off the TV, log off the computer, and turn down the volume on music. This can help you relax after a busy day.     Drowsy Driving  The [address] [place] [address] [employer] Traffic Safety Administration cites drowsiness as a causing factor in more than 469,894 police reported crashes annually, resulting in 76,000 injuries and 1,500 deaths. Other surveys suggest 55% of people polled have driven while drowsy in the past year, 23% had fallen asleep but not crashed, 3% crashed, and 2% had and accident due to drowsy driving. Who is at risk? Young Drivers: One study of drowsy driving accidents states that 55% of the drivers were under 25 years. Of those, 75% were male. Shift Workers and Travelers: People who work overnight or travel across time zones frequently are at higher risk of experiencing Circadian Rhythm Disorders. They are trying to work and function when their body is programed to sleep. Sleep Deprived: Lack of sleep has a serious impact on your ability to pay attention or focus on a task. Consistently getting less than the average of 8 hours your body needs creates partial or cumulative sleep deprivation. Untreated Sleep Disorders: Sleep Apnea, Narcolepsy, R.L.S., and other sleep disorders (untreated) prevent a person from getting enough restful sleep. This leads to excessive daytime sleepiness and increases the risk for drowsy driving accidents by up to 7 times. Medications / Alcohol: Even over the counter medications can cause drowsiness. Medications that impair a drivers attention should have a warning label. Alcohol naturally makes you sleepy and on its own can cause accidents. Combined with excessive drowsiness its effects are amplified. Signs of Drowsy Driving:   * You don't remember driving the last few miles   * You may drift out of your hugh   * You are unable to focus and your thoughts wander   * You may yawn more often than normal   * You have difficulty keeping your eyes open / nodding off   * Missing traffic signs, speeding, or tailgating  Prevention-   Good sleep hygiene, lifestyle and behavioral choices have the most impact on drowsy driving.  There is no substitute for sleep and the average person requires 8 hours nightly. If you find yourself driving drowsy, stop and sleep. Consider the sleep hygiene tips provided during your visit as well. Medication Refill Policy: Refills for all medications require 1 week advance notice. Please have your pharmacy fax a refill request. We are unable to fax, or call in \"controled substance\" medications and you will need to pick these prescriptions up from our office. [employer] Activation    Thank you for requesting access to [employer]. Please follow the instructions below to securely access and download your online medical record. [employer] allows you to send messages to your doctor, view your test results, renew your prescriptions, schedule appointments, and more. How Do I Sign Up? 1. In your internet browser, go to [email or web address]. 2. Click on the First Time User? Click Here link in the Sign In box. You will see the New Member Sign Up page. 3. Enter your [employer] Access Code exactly as it appears below. You will not need to use this code after youve completed the sign-up process. If you do not sign up before the expiration date, you must request a new code. [employer] Access Code: [email or web address]  Expires: 2018 12:14 PM (This is the date your [employer] access code will )    4. Enter the last four digits of your Social Security Number (xxxx) and Date of Birth (mm/dd/yyyy) as indicated and click Submit. You will be taken to the next sign-up page. 5. Create a RentM[employer] ID. This will be your [employer] login ID and cannot be changed, so think of one that is secure and easy to remember. 6. Create a [employer] password. You can change your password at any time. 7. Enter your Password Reset Question and Answer. This can be used at a later time if you forget your password. 8. Enter your e-mail address. You will receive e-mail notification when new information is available in [address] 9. Click Sign Up.  You can now view and download portions of your medical record. 10. Click the Download Summary menu link to download a portable copy of your medical information. Additional Information    If you have questions, please call [phone]. Remember, [employer] is NOT to be used for urgent needs. For medical emergencies, dial 911.

## 2019-02-04 ENCOUNTER — OFFICE VISIT (OUTPATIENT)
Dept: SLEEP MEDICINE | Age: 44
End: 2019-02-04

## 2019-02-04 VITALS
BODY MASS INDEX: 29.64 KG/M2 | OXYGEN SATURATION: 98 % | WEIGHT: 157 LBS | HEIGHT: 61 IN | DIASTOLIC BLOOD PRESSURE: 73 MMHG | HEART RATE: 81 BPM | SYSTOLIC BLOOD PRESSURE: 108 MMHG

## 2019-02-04 DIAGNOSIS — G47.10 HYPERSOMNIA: Primary | ICD-10-CM

## 2019-02-04 RX ORDER — METHYLPHENIDATE HYDROCHLORIDE 5 MG/1
5 TABLET ORAL 3 TIMES DAILY
Qty: 90 TAB | Refills: 0 | Status: SHIPPED | OUTPATIENT
Start: 2019-03-04 | End: 2019-03-20

## 2019-02-04 RX ORDER — GABAPENTIN 100 MG/1
200 CAPSULE ORAL
COMMUNITY
End: 2019-10-23 | Stop reason: ALTCHOICE

## 2019-02-04 RX ORDER — METHYLPHENIDATE HYDROCHLORIDE 5 MG/1
5 TABLET ORAL 3 TIMES DAILY
Qty: 90 TAB | Refills: 0 | Status: SHIPPED | OUTPATIENT
Start: 2019-02-04 | End: 2019-03-20

## 2019-02-04 RX ORDER — METHYLPHENIDATE HYDROCHLORIDE 5 MG/1
5 TABLET ORAL 3 TIMES DAILY
Qty: 90 TAB | Refills: 0 | Status: SHIPPED | OUTPATIENT
Start: 2019-04-04 | End: 2020-02-14

## 2019-02-04 NOTE — PROGRESS NOTES
[address] [facility] [address] [place]. [place], [address]  Tel.  [phone]  Fax. 100 [facility] 60  [place], [address]  Tel.  [phone]  Fax. [phone] 9[address] [facility] [place]   Tel.  [phone]  Fax. [phone]     S>Zofia Lennon is a 39 y.o. female seen for Sleep Problem (6 month med f/up)    Feeling less sleepy these days. She is eating better, less carbohydrates. She is on iron replacement therapy as ferritin was found to by low. Currently taking the ritalin once daily with good control of sleepiness. Allergies   Allergen Reactions    Erythromycin With Ethanol Nausea Only    Amoxicillin Unknown (comments)     Resistance    Wheat Swelling       She has a current medication list which includes the following prescription(s): methylphenidate hcl, methylphenidate hcl, methylphenidate hcl, gabapentin, iron bisgl &ps [id number], kurvelo, OTHER, OTHER, vitamin k, potassium/magnesium, albuterol, bupropion hcl, fluoxetine, metformin, ascorbate calcium, cholecalciferol (vitamin d3), cyanocobalamin, cetirizine, ethynodiol d-ethinyl estradiol, and dexamethasone. .      She  has a past medical history of Anxiety (12/19/2016), Celiac disease (12/19/2016), Hypersomnia, idiopathic (12/19/2016), Perennial allergic rhinitis (12/19/2016), and Pre-diabetes (12/19/2016). [place] Sleepiness Score: 12   and Modified F.O.S.Q. Score Total / 2: 15         O>    Visit Vitals  /73 (BP 1 Location: Left arm, BP Patient Position: Sitting)   Pulse 81   Ht 5' 1\" (1.549 m)   Wt 157 lb (71.2 kg)   SpO2 98%   BMI 29.66 kg/m²           General:   Alert, oriented, not in distress   Neck:   No JVD    Chest/Lungs:  symetrical lung expansion , no accessory muscle use    Extremities:  no obvious rashes , negative edema    Neuro:  No focal deficits ; No obvious tremor    Psych:  Normal affect ,  Normal countenance ;       A>    ICD-10-CM ICD-9-CM    1.  Hypersomnia G47.10 780.54 methylphenidate HCl (RITALIN) 5 mg tablet      methylphenidate HCl (RITALIN) 5 mg tablet      methylphenidate HCl (RITALIN) 5 mg tablet           P>      She will continue on current dosage of ritalin 2-3 times a day. Prescription renewed today. Proper sleep hygiene and safe driving were reviewed today and all of her questions were addressed. Regular exercise regimen advised.    Electronically signed by    Brant Gonzalez MD  Diplomate in Sleep Medicine  [facility]

## 2019-02-04 NOTE — PATIENT INSTRUCTIONS
217 [facility] [place]. [place], [address]  Tel.  [phone]  Fax. 100 [facility] 60  [place], [address] [facility] [address]  Tel.  [phone]  Fax. [phone] 9250 [address]  Tel.  [phone]  Fax. [phone]     PROPER SLEEP HYGIENE    What to avoid  · Do not have drinks with caffeine, such as coffee or black tea, for 8 hours before bed. · Do not smoke or use other types of tobacco near bedtime. Nicotine is a stimulant and can keep you awake. · Avoid drinking alcohol late in the evening, because it can cause you to wake in the middle of the night. · Do not eat a big meal close to bedtime. If you are hungry, eat a light snack. · Do not drink a lot of water close to bedtime, because the need to urinate may wake you up during the night. · Do not read or watch TV in bed. Use the bed only for sleeping and sexual activity. What to try  · Go to bed at the same time every night, and wake up at the same time every morning. Do not take naps during the day. · Keep your bedroom quiet, dark, and cool. · Get regular exercise, but not within 3 to 4 hours of your bedtime. .  · Sleep on a comfortable pillow and mattress. · If watching the clock makes you anxious, turn it facing away from you so you cannot see the time. · If you worry when you lie down, start a worry book. Well before bedtime, write down your worries, and then set the book and your concerns aside. · Try meditation or other relaxation techniques before you go to bed. · If you cannot fall asleep, get up and go to another room until you feel sleepy. Do something relaxing. Repeat your bedtime routine before you go to bed again. · Make your house quiet and calm about an hour before bedtime. Turn down the lights, turn off the TV, log off the computer, and turn down the volume on music. This can help you relax after a busy day.     Drowsy Driving  The [address] [place] [address] Road Traffic Safety Administration cites drowsiness as a causing factor in more than 660,206 police reported crashes annually, resulting in 76,000 injuries and 1,500 deaths. Other surveys suggest 55% of people polled have driven while drowsy in the past year, 23% had fallen asleep but not crashed, 3% crashed, and 2% had and accident due to drowsy driving. Who is at risk? Young Drivers: One study of drowsy driving accidents states that 55% of the drivers were under 25 years. Of those, 75% were male. Shift Workers and Travelers: People who work overnight or travel across time zones frequently are at higher risk of experiencing Circadian Rhythm Disorders. They are trying to work and function when their body is programed to sleep. Sleep Deprived: Lack of sleep has a serious impact on your ability to pay attention or focus on a task. Consistently getting less than the average of 8 hours your body needs creates partial or cumulative sleep deprivation. Untreated Sleep Disorders: Sleep Apnea, Narcolepsy, R.L.S., and other sleep disorders (untreated) prevent a person from getting enough restful sleep. This leads to excessive daytime sleepiness and increases the risk for drowsy driving accidents by up to 7 times. Medications / Alcohol: Even over the counter medications can cause drowsiness. Medications that impair a drivers attention should have a warning label. Alcohol naturally makes you sleepy and on its own can cause accidents. Combined with excessive drowsiness its effects are amplified. Signs of Drowsy Driving:   * You don't remember driving the last few miles   * You may drift out of your hugh   * You are unable to focus and your thoughts wander   * You may yawn more often than normal   * You have difficulty keeping your eyes open / nodding off   * Missing traffic signs, speeding, or tailgating  Prevention-   Good sleep hygiene, lifestyle and behavioral choices have the most impact on drowsy driving.  There is no substitute for sleep and the average person requires 8 hours nightly. If you find yourself driving drowsy, stop and sleep. Consider the sleep hygiene tips provided during your visit as well. Medication Refill Policy: Refills for all medications require 1 week advance notice. Please have your pharmacy fax a refill request. We are unable to fax, or call in \"controled substance\" medications and you will need to pick these prescriptions up from our office. [address] Activation    Thank you for requesting access to [employer]. Please follow the instructions below to securely access and download your online medical record. Fast[employer] allows you to send messages to your doctor, view your test results, renew your prescriptions, schedule appointments, and more. How Do I Sign Up? 1. In your internet browser, go to [email or web address]. 2. Click on the First Time User? Click Here link in the Sign In box. You will see the New Member Sign Up page. 3. Enter your [employer] Access Code exactly as it appears below. You will not need to use this code after youve completed the sign-up process. If you do not sign up before the expiration date, you must request a new code. [employer]Fig Access Code: Activation code not generated  Current FastFig Status: Active (This is the date your FastFig access code will )    4. Enter the last four digits of your Social Security Number (xxxx) and Date of Birth (mm/dd/yyyy) as indicated and click Submit. You will be taken to the next sign-up page. 5. Create a FastFig ID. This will be your FastFig login ID and cannot be changed, so think of one that is secure and easy to remember. 6. Create a FastFig password. You can change your password at any time. 7. Enter your Password Reset Question and Answer. This can be used at a later time if you forget your password. 8. Enter your e-mail address. You will receive e-mail notification when new information is available in [address]   9. Click Sign Up. You can now view and download portions of your medical record. 10. Click the Download Summary menu link to download a portable copy of your medical information. Additional Information    If you have questions, please call [phone]. Remember, [employer] is NOT to be used for urgent needs. For medical emergencies, dial 911.

## 2019-03-20 ENCOUNTER — HOSPITAL ENCOUNTER (OUTPATIENT)
Dept: PREADMISSION TESTING | Age: 44
Discharge: HOME | End: 2019-03-20
Payer: COMMERCIAL

## 2019-03-20 VITALS
WEIGHT: 154 LBS | SYSTOLIC BLOOD PRESSURE: 106 MMHG | DIASTOLIC BLOOD PRESSURE: 67 MMHG | HEART RATE: 73 BPM | HEIGHT: 61 IN | TEMPERATURE: 98.1 F | BODY MASS INDEX: 29.07 KG/M2

## 2019-03-20 LAB
ANION GAP SERPL CALC-SCNC: 5 MMOL/L (ref 5–15)
BASOPHILS # BLD: 0.1 K/UL (ref 0–0.1)
BASOPHILS NFR BLD: 1 % (ref 0–1)
BUN SERPL-MCNC: 19 MG/DL (ref 6–20)
BUN/CREAT SERPL: 28 (ref 12–20)
CALCIUM SERPL-MCNC: 9.4 MG/DL (ref 8.5–10.1)
CHLORIDE SERPL-SCNC: 105 MMOL/L (ref 97–108)
CO2 SERPL-SCNC: 28 MMOL/L (ref 21–32)
CREAT SERPL-MCNC: 0.68 MG/DL (ref 0.55–1.02)
DIFFERENTIAL METHOD BLD: NORMAL
EOSINOPHIL # BLD: 0.1 K/UL (ref 0–0.4)
EOSINOPHIL NFR BLD: 1 % (ref 0–7)
ERYTHROCYTE [DISTWIDTH] IN BLOOD BY AUTOMATED COUNT: 13.2 % (ref 11.5–14.5)
GLUCOSE SERPL-MCNC: 85 MG/DL (ref 65–100)
HCT VFR BLD AUTO: 40.4 % (ref 35–47)
HGB BLD-MCNC: 13.5 G/DL (ref 11.5–16)
IMM GRANULOCYTES # BLD AUTO: 0 K/UL (ref 0–0.04)
IMM GRANULOCYTES NFR BLD AUTO: 0 % (ref 0–0.5)
LYMPHOCYTES # BLD: 2.3 K/UL (ref 0.8–3.5)
LYMPHOCYTES NFR BLD: 28 % (ref 12–49)
MCH RBC QN AUTO: 32.2 PG (ref 26–34)
MCHC RBC AUTO-ENTMCNC: 33.4 G/DL (ref 30–36.5)
MCV RBC AUTO: 96.4 FL (ref 80–99)
MONOCYTES # BLD: 0.4 K/UL (ref 0–1)
MONOCYTES NFR BLD: 5 % (ref 5–13)
NEUTS SEG # BLD: 5.2 K/UL (ref 1.8–8)
NEUTS SEG NFR BLD: 65 % (ref 32–75)
NRBC # BLD: 0 K/UL (ref 0–0.01)
NRBC BLD-RTO: 0 PER 100 WBC
PLATELET # BLD AUTO: 384 K/UL (ref 150–400)
PMV BLD AUTO: 10.2 FL (ref 8.9–12.9)
POTASSIUM SERPL-SCNC: 4.1 MMOL/L (ref 3.5–5.1)
RBC # BLD AUTO: 4.19 M/UL (ref 3.8–5.2)
SODIUM SERPL-SCNC: 138 MMOL/L (ref 136–145)
WBC # BLD AUTO: 8.1 K/UL (ref 3.6–11)

## 2019-03-20 PROCEDURE — 83036 HEMOGLOBIN GLYCOSYLATED A1C: CPT

## 2019-03-20 PROCEDURE — 80048 BASIC METABOLIC PNL TOTAL CA: CPT

## 2019-03-20 PROCEDURE — 85025 COMPLETE CBC W/AUTO DIFF WBC: CPT

## 2019-03-20 PROCEDURE — 36415 COLL VENOUS BLD VENIPUNCTURE: CPT

## 2019-03-20 RX ORDER — CALCIUM/MAGNESIUM/ZINC 333-133 MG
TABLET ORAL
COMMUNITY

## 2019-03-21 LAB
EST. AVERAGE GLUCOSE BLD GHB EST-MCNC: 108 MG/DL
HBA1C MFR BLD: 5.4 % (ref 4.2–6.3)

## 2019-03-28 RX ORDER — HYDROMORPHONE HYDROCHLORIDE 1 MG/ML
0.2 INJECTION, SOLUTION INTRAMUSCULAR; INTRAVENOUS; SUBCUTANEOUS
Status: CANCELLED | OUTPATIENT
Start: 2019-03-28

## 2019-03-28 RX ORDER — ONDANSETRON 2 MG/ML
4 INJECTION INTRAMUSCULAR; INTRAVENOUS AS NEEDED
Status: CANCELLED | OUTPATIENT
Start: 2019-03-28

## 2019-03-28 RX ORDER — MIDAZOLAM HYDROCHLORIDE 1 MG/ML
0.5 INJECTION, SOLUTION INTRAMUSCULAR; INTRAVENOUS
Status: CANCELLED | OUTPATIENT
Start: 2019-03-28

## 2019-03-28 RX ORDER — SODIUM CHLORIDE, SODIUM LACTATE, POTASSIUM CHLORIDE, CALCIUM CHLORIDE 600; 310; 30; 20 MG/100ML; MG/100ML; MG/100ML; MG/100ML
100 INJECTION, SOLUTION INTRAVENOUS CONTINUOUS
Status: CANCELLED | OUTPATIENT
Start: 2019-03-28

## 2019-03-28 RX ORDER — FENTANYL CITRATE 50 UG/ML
25 INJECTION, SOLUTION INTRAMUSCULAR; INTRAVENOUS
Status: CANCELLED | OUTPATIENT
Start: 2019-03-28

## 2019-03-28 RX ORDER — OXYCODONE HYDROCHLORIDE 5 MG/1
5 TABLET ORAL AS NEEDED
Status: CANCELLED | OUTPATIENT
Start: 2019-03-28

## 2019-03-28 RX ORDER — DIPHENHYDRAMINE HYDROCHLORIDE 50 MG/ML
12.5 INJECTION, SOLUTION INTRAMUSCULAR; INTRAVENOUS AS NEEDED
Status: CANCELLED | OUTPATIENT
Start: 2019-03-28 | End: 2019-03-28

## 2019-03-28 RX ORDER — MORPHINE SULFATE 10 MG/ML
2 INJECTION, SOLUTION INTRAMUSCULAR; INTRAVENOUS
Status: CANCELLED | OUTPATIENT
Start: 2019-03-28

## 2019-03-28 RX ORDER — SODIUM CHLORIDE 0.9 % (FLUSH) 0.9 %
5-40 SYRINGE (ML) INJECTION EVERY 8 HOURS
Status: CANCELLED | OUTPATIENT
Start: 2019-03-28

## 2019-03-28 RX ORDER — SODIUM CHLORIDE 0.9 % (FLUSH) 0.9 %
5-40 SYRINGE (ML) INJECTION AS NEEDED
Status: CANCELLED | OUTPATIENT
Start: 2019-03-28

## 2019-03-28 NOTE — H&P
[address] HISTORY AND PHYSICAL Name:  Mario Last 
MR#:  [id number] :  1975 ACCOUNT #:  [de-identified] ADMIT DATE:  2019 CHIEF COMPLAINT:  Painful right foot. HISTORY OF PRESENT ILLNESS:  This is a healthy female patient who has diabetes and is taking metformin to control this. Recent A1c was 5.4%, which is excellent. She had developed exostosis deformity, dorsal aspect of right foot with impingement along the extensor tendon right foot. Exhaustive conservative treatment has not been effective for her, so we are opting for surgical resection of the exostosis deformity, dorsal right foot with repair of the extensor tendon. She understands the risks and possible complications:  DVT, pulmonary embolism, complications with breathing and anesthesia, CRPS, nerve injury, failure to relieve pain, infection, failure to tolerate shoe gear for up to 1 year, failure to tolerate shoe gear overall, swelling lasting greater than 1 year. The patient understands that these are very low and actual percentage of occurrence and this is also discussed thoroughly. Due to failed conservative treatment options, I feel this is the best option for her and a less invasive to gain the most positive response postoperatively. The patient understands and is fully agreeable to the surgical procedure. MEDICATION/ALLERGIES:  ALLERGIES TO GRASS POLLEN, CATS AND DOGS, BUT NOT TO MEDICATION. MEDICATIONS:  Prozac 10 mg, metformin 500 mg daily, Ritalin 5 mg, Slow  mg, vitamin C, vitamin D3. PAST MEDICAL HISTORY:  Chronic depression, history of multiple allergens, type 2 diabetes controlled well. PAST SURGICAL HISTORY:   section, no trouble with anesthesia, no sequela. FAMILY HISTORY:  On the paternal side, acute arthritis and essential hypertension. Grandparents with bilateral cataracts and ischemic stroke. SOCIAL HISTORY:  Describes drinking as social, never a smoker. No history of recreational drug use. REVIEW OF SYSTEMS:  Negative for this patient. PHYSICAL EXAMINATION: 
GENERAL:  In no distress. VITAL SIGNS:  Stable. HEENT:  Noncontributory. CARDIAC:  Normal rate and rhythm, no palpitations. PULMONARY:  No wheezing, no shortness of breath. ABDOMEN:  No painful quadrants, no organomegaly. INTEGUMENT:  Within normal limits. VASCULAR:  Palpable pedal pulses bilaterally, lower extremity. NEUROLOGIC:  Grossly intact lower extremity exam bilaterally. No Tinel's sign elicited on percussion of the dorsal deformity of the right foot. MUSCULOSKELETAL:  Pain on palpation of the dorsal bump first metatarsocuneiform joint region, dorsal right foot. Forefoot, valgus foot structures seen bilaterally as judged from subtalar joint, neutral position. Calf girth within normal limits and symmetrical. 
 
ASSESSMENT: 
1. Exostosis deformity, dorsal aspect right foot. 2.  Extensor tendon injury, dorsal aspect, chronic, right foot. TREATMENT PLAN:  The patient has given thorough history and physical and found to be medically stable. We are opting for general anesthesia followed by local infiltrated anesthesia, as explained to the patient to her understanding. All risks and possible complications were explained, as well as the reasoning for the surgery due to failed conservative treatment. The patient understands and is fully agreeable. We will follow her back postoperatively here at our clinic in [place]. Rosy Flowers MD 
 
 
[id number] 
D:  03/28/2019 9:12 
T:  03/28/2019 10:14 
JOB #:  [id number]

## 2019-03-29 ENCOUNTER — ANESTHESIA (OUTPATIENT)
Dept: MEDSURG UNIT | Age: 44
End: 2019-03-29
Payer: COMMERCIAL

## 2019-03-29 ENCOUNTER — HOSPITAL ENCOUNTER (OUTPATIENT)
Age: 44
Setting detail: OUTPATIENT SURGERY
Discharge: HOME | End: 2019-03-29
Attending: PODIATRIST | Admitting: PODIATRIST
Payer: COMMERCIAL

## 2019-03-29 ENCOUNTER — ANESTHESIA EVENT (OUTPATIENT)
Dept: MEDSURG UNIT | Age: 44
End: 2019-03-29
Payer: COMMERCIAL

## 2019-03-29 VITALS
SYSTOLIC BLOOD PRESSURE: 123 MMHG | TEMPERATURE: 98.8 F | WEIGHT: 154 LBS | OXYGEN SATURATION: 96 % | DIASTOLIC BLOOD PRESSURE: 74 MMHG | RESPIRATION RATE: 16 BRPM | BODY MASS INDEX: 29.07 KG/M2 | HEART RATE: 84 BPM | HEIGHT: 61 IN

## 2019-03-29 LAB
GLUCOSE BLD STRIP.AUTO-MCNC: 91 MG/DL (ref 65–100)
GLUCOSE BLD STRIP.AUTO-MCNC: 98 MG/DL (ref 65–100)
HCG UR QL: NEGATIVE
SERVICE CMNT-IMP: NORMAL
SERVICE CMNT-IMP: NORMAL

## 2019-03-29 PROCEDURE — 77030020754 HC CUF TRNQT 2BLA STRY -B: Performed by: PODIATRIST

## 2019-03-29 PROCEDURE — 74011250636 HC RX REV CODE- 250/636

## 2019-03-29 PROCEDURE — 77030011640 HC PAD GRND REM COVD -A: Performed by: PODIATRIST

## 2019-03-29 PROCEDURE — 77030006831 HC BLD SAW SAG MCRA -B: Performed by: PODIATRIST

## 2019-03-29 PROCEDURE — 76210000046 HC AMBSU PH II REC FIRST 0.5 HR: Performed by: PODIATRIST

## 2019-03-29 PROCEDURE — 77030031139 HC SUT VCRL2 J&J -A: Performed by: PODIATRIST

## 2019-03-29 PROCEDURE — 82962 GLUCOSE BLOOD TEST: CPT

## 2019-03-29 PROCEDURE — 74011250636 HC RX REV CODE- 250/636: Performed by: PODIATRIST

## 2019-03-29 PROCEDURE — 76030000000 HC AMB SURG OR TIME 0.5 TO 1: Performed by: PODIATRIST

## 2019-03-29 PROCEDURE — 76210000035 HC AMBSU PH I REC 1 TO 1.5 HR: Performed by: PODIATRIST

## 2019-03-29 PROCEDURE — 77030020782 HC GWN BAIR PAWS FLX 3M -B

## 2019-03-29 PROCEDURE — 76060000061 HC AMB SURG ANES 0.5 TO 1 HR: Performed by: PODIATRIST

## 2019-03-29 PROCEDURE — 81025 URINE PREGNANCY TEST: CPT

## 2019-03-29 PROCEDURE — 77030016653 HC BUR OVL4 STRY -B: Performed by: PODIATRIST

## 2019-03-29 PROCEDURE — 77030018836 HC SOL IRR NACL ICUM -A: Performed by: PODIATRIST

## 2019-03-29 PROCEDURE — 74011000250 HC RX REV CODE- 250: Performed by: PODIATRIST

## 2019-03-29 RX ORDER — SODIUM CHLORIDE, SODIUM LACTATE, POTASSIUM CHLORIDE, CALCIUM CHLORIDE 600; 310; 30; 20 MG/100ML; MG/100ML; MG/100ML; MG/100ML
25 INJECTION, SOLUTION INTRAVENOUS CONTINUOUS
Status: DISCONTINUED | OUTPATIENT
Start: 2019-03-29 | End: 2019-03-29 | Stop reason: HOSPADM

## 2019-03-29 RX ORDER — ACETAMINOPHEN 325 MG/1
650 TABLET ORAL ONCE
Status: DISCONTINUED | OUTPATIENT
Start: 2019-03-29 | End: 2019-03-29 | Stop reason: HOSPADM

## 2019-03-29 RX ORDER — SODIUM CHLORIDE, SODIUM LACTATE, POTASSIUM CHLORIDE, CALCIUM CHLORIDE 600; 310; 30; 20 MG/100ML; MG/100ML; MG/100ML; MG/100ML
INJECTION, SOLUTION INTRAVENOUS
Status: DISCONTINUED | OUTPATIENT
Start: 2019-03-29 | End: 2019-03-29 | Stop reason: HOSPADM

## 2019-03-29 RX ORDER — DEXAMETHASONE SODIUM PHOSPHATE 4 MG/ML
INJECTION, SOLUTION INTRA-ARTICULAR; INTRALESIONAL; INTRAMUSCULAR; INTRAVENOUS; SOFT TISSUE AS NEEDED
Status: DISCONTINUED | OUTPATIENT
Start: 2019-03-29 | End: 2019-03-29 | Stop reason: HOSPADM

## 2019-03-29 RX ORDER — SODIUM CHLORIDE 0.9 % (FLUSH) 0.9 %
5-40 SYRINGE (ML) INJECTION AS NEEDED
Status: DISCONTINUED | OUTPATIENT
Start: 2019-03-29 | End: 2019-03-29 | Stop reason: HOSPADM

## 2019-03-29 RX ORDER — MIDAZOLAM HYDROCHLORIDE 1 MG/ML
1 INJECTION, SOLUTION INTRAMUSCULAR; INTRAVENOUS AS NEEDED
Status: DISCONTINUED | OUTPATIENT
Start: 2019-03-29 | End: 2019-03-29 | Stop reason: HOSPADM

## 2019-03-29 RX ORDER — LIDOCAINE HYDROCHLORIDE 20 MG/ML
INJECTION, SOLUTION EPIDURAL; INFILTRATION; INTRACAUDAL; PERINEURAL AS NEEDED
Status: DISCONTINUED | OUTPATIENT
Start: 2019-03-29 | End: 2019-03-29 | Stop reason: HOSPADM

## 2019-03-29 RX ORDER — BUPIVACAINE HYDROCHLORIDE AND EPINEPHRINE 5; 5 MG/ML; UG/ML
30 INJECTION, SOLUTION EPIDURAL; INTRACAUDAL; PERINEURAL ONCE
Status: COMPLETED | OUTPATIENT
Start: 2019-03-29 | End: 2019-03-29

## 2019-03-29 RX ORDER — MIDAZOLAM HYDROCHLORIDE 1 MG/ML
INJECTION, SOLUTION INTRAMUSCULAR; INTRAVENOUS AS NEEDED
Status: DISCONTINUED | OUTPATIENT
Start: 2019-03-29 | End: 2019-03-29 | Stop reason: HOSPADM

## 2019-03-29 RX ORDER — PROPOFOL 10 MG/ML
INJECTION, EMULSION INTRAVENOUS AS NEEDED
Status: DISCONTINUED | OUTPATIENT
Start: 2019-03-29 | End: 2019-03-29 | Stop reason: HOSPADM

## 2019-03-29 RX ORDER — FENTANYL CITRATE 50 UG/ML
INJECTION, SOLUTION INTRAMUSCULAR; INTRAVENOUS AS NEEDED
Status: DISCONTINUED | OUTPATIENT
Start: 2019-03-29 | End: 2019-03-29 | Stop reason: HOSPADM

## 2019-03-29 RX ORDER — ROPIVACAINE HYDROCHLORIDE 5 MG/ML
30 INJECTION, SOLUTION EPIDURAL; INFILTRATION; PERINEURAL AS NEEDED
Status: DISCONTINUED | OUTPATIENT
Start: 2019-03-29 | End: 2019-03-29 | Stop reason: HOSPADM

## 2019-03-29 RX ORDER — SODIUM CHLORIDE 9 MG/ML
25 INJECTION, SOLUTION INTRAVENOUS CONTINUOUS
Status: DISCONTINUED | OUTPATIENT
Start: 2019-03-29 | End: 2019-03-29 | Stop reason: HOSPADM

## 2019-03-29 RX ORDER — FENTANYL CITRATE 50 UG/ML
50 INJECTION, SOLUTION INTRAMUSCULAR; INTRAVENOUS AS NEEDED
Status: DISCONTINUED | OUTPATIENT
Start: 2019-03-29 | End: 2019-03-29 | Stop reason: HOSPADM

## 2019-03-29 RX ORDER — CLINDAMYCIN PHOSPHATE 900 MG/50ML
900 INJECTION INTRAVENOUS ONCE
Status: COMPLETED | OUTPATIENT
Start: 2019-03-29 | End: 2019-03-29

## 2019-03-29 RX ORDER — LIDOCAINE HYDROCHLORIDE 10 MG/ML
0.1 INJECTION, SOLUTION EPIDURAL; INFILTRATION; INTRACAUDAL; PERINEURAL AS NEEDED
Status: DISCONTINUED | OUTPATIENT
Start: 2019-03-29 | End: 2019-03-29 | Stop reason: HOSPADM

## 2019-03-29 RX ORDER — SODIUM CHLORIDE 0.9 % (FLUSH) 0.9 %
5-40 SYRINGE (ML) INJECTION EVERY 8 HOURS
Status: DISCONTINUED | OUTPATIENT
Start: 2019-03-29 | End: 2019-03-29 | Stop reason: HOSPADM

## 2019-03-29 RX ORDER — OXYCODONE AND ACETAMINOPHEN 7.5; 325 MG/1; MG/1
1 TABLET ORAL
Status: CANCELLED | OUTPATIENT
Start: 2019-03-29

## 2019-03-29 RX ADMIN — FENTANYL CITRATE 25 MCG: 50 INJECTION, SOLUTION INTRAMUSCULAR; INTRAVENOUS at 09:53

## 2019-03-29 RX ADMIN — FENTANYL CITRATE 25 MCG: 50 INJECTION, SOLUTION INTRAMUSCULAR; INTRAVENOUS at 10:01

## 2019-03-29 RX ADMIN — PROPOFOL 160 MG: 10 INJECTION, EMULSION INTRAVENOUS at 09:48

## 2019-03-29 RX ADMIN — SODIUM CHLORIDE, SODIUM LACTATE, POTASSIUM CHLORIDE, CALCIUM CHLORIDE: 600; 310; 30; 20 INJECTION, SOLUTION INTRAVENOUS at 09:40

## 2019-03-29 RX ADMIN — LIDOCAINE HYDROCHLORIDE 80 MG: 20 INJECTION, SOLUTION EPIDURAL; INFILTRATION; INTRACAUDAL; PERINEURAL at 09:48

## 2019-03-29 RX ADMIN — FENTANYL CITRATE 25 MCG: 50 INJECTION, SOLUTION INTRAMUSCULAR; INTRAVENOUS at 09:48

## 2019-03-29 RX ADMIN — FENTANYL CITRATE 25 MCG: 50 INJECTION, SOLUTION INTRAMUSCULAR; INTRAVENOUS at 10:20

## 2019-03-29 RX ADMIN — CLINDAMYCIN IN 5 PERCENT DEXTROSE 900 MG: 18 INJECTION, SOLUTION INTRAVENOUS at 09:55

## 2019-03-29 RX ADMIN — DEXAMETHASONE SODIUM PHOSPHATE 4 MG: 4 INJECTION, SOLUTION INTRA-ARTICULAR; INTRALESIONAL; INTRAMUSCULAR; INTRAVENOUS; SOFT TISSUE at 10:07

## 2019-03-29 RX ADMIN — MIDAZOLAM HYDROCHLORIDE 2 MG: 1 INJECTION, SOLUTION INTRAMUSCULAR; INTRAVENOUS at 09:41

## 2019-03-29 NOTE — DISCHARGE INSTRUCTIONS
-Elevate right foot, ice to front ankle right  -Use your cast boot for all walking right foot, only to tolerance  -Do not remove your bandage        DISCHARGE SUMMARY from Nurse    PATIENT INSTRUCTIONS:    After general anesthesia or intravenous sedation, for 24 hours or while taking prescription Narcotics:  · Limit your activities  · Do not drive and operate hazardous machinery  · Do not make important personal or business decisions  · Do  not drink alcoholic beverages  · If you have not urinated within 8 hours after discharge, please contact your surgeon on call. Report the following to your surgeon:  · Excessive pain, swelling, redness or odor of or around the surgical area  · Temperature over 100.5  · Nausea and vomiting lasting longer than 4 hours or if unable to take medications  · Any signs of decreased circulation or nerve impairment to extremity: change in color, persistent  numbness, tingling, coldness or increase pain  · Any questions    What to do at Home:  Recommended activity: See surgical instructions as noted above. If you experience any of the above symptoms. Please follow up with Dr. Tam Pacheco. *  Please give a list of your current medications to your Primary Care Provider. *  Please update this list whenever your medications are discontinued, doses are      changed, or new medications (including over-the-counter products) are added. *  Please carry medication information at all times in case of emergency situations. These are general instructions for a healthy lifestyle:    No smoking/ No tobacco products/ Avoid exposure to second hand smoke  Surgeon General's Warning:  Quitting smoking now greatly reduces serious risk to your health.     Obesity, smoking, and sedentary lifestyle greatly increases your risk for illness    A healthy diet, regular physical exercise & weight monitoring are important for maintaining a healthy lifestyle    You may be retaining fluid if you have a history of heart failure or if you experience any of the following symptoms:  Weight gain of 3 pounds or more overnight or 5 pounds in a week, increased swelling in our hands or feet or shortness of breath while lying flat in bed. Please call your doctor as soon as you notice any of these symptoms; do not wait until your next office visit. Recognize signs and symptoms of STROKE:    F-face looks uneven    A-arms unable to move or move unevenly    S-speech slurred or non-existent    T-time-call 911 as soon as signs and symptoms begin-DO NOT go       Back to bed or wait to see if you get better-TIME IS BRAIN. Warning Signs of HEART ATTACK     Call 911 if you have these symptoms:   Chest discomfort. Most heart attacks involve discomfort in the center of the chest that lasts more than a few minutes, or that goes away and comes back. It can feel like uncomfortable pressure, squeezing, fullness, or pain.  Discomfort in other areas of the upper body. Symptoms can include pain or discomfort in one or both arms, the back, neck, jaw, or stomach.  Shortness of breath with or without chest discomfort.  Other signs may include breaking out in a cold sweat, nausea, or lightheadedness. Don't wait more than five minutes to call 911 - MINUTES MATTER! Fast action can save your life. Calling 911 is almost always the fastest way to get lifesaving treatment. Emergency Medical Services staff can begin treatment when they arrive -- up to an hour sooner than if someone gets to the hospital by car. The discharge information has been reviewed with the patient and parent. The patient and parent verbalized understanding. Discharge medications reviewed with the patient and caregiver and appropriate educational materials and side effects teaching were provided.   ___________________________________________________________________________________________________________________________________

## 2019-03-29 NOTE — ANESTHESIA POSTPROCEDURE EVALUATION
Post-Anesthesia Evaluation and Assessment Patient: Tiburcio Mccauley MRN: [id number]  SSN: [id number] YOB: 1975  Age: 39 y.o. Sex: female I have evaluated the patient and they are stable and ready for discharge from the PACU. Cardiovascular Function/Vital Signs Visit Vitals /54 Pulse (!) 101 Temp 37.1 °C (98.8 °F) Resp 16 Ht 5' 1\" (1.549 m) Wt 69.9 kg (154 lb) SpO2 98% BMI 29.10 kg/m² Patient is status post General anesthesia for Procedure(s): REMOVAL BONE SPUR RIGHT FOOT WITH REPAIR OF TENDON EXTENSOR  RIGHT FOOT. Nausea/Vomiting: None Postoperative hydration reviewed and adequate. Pain: 
Pain Scale 1: Numeric (0 - 10) (03/29/19 0304) Pain Intensity 1: 8 (03/29/19 9652) Managed Neurological Status:  
Neuro (WDL): Within Defined Limits (03/29/19 0753) At baseline Mental Status, Level of Consciousness: Alert and  oriented to person, place, and time Pulmonary Status:  
O2 Device: Nasal cannula (03/29/19 1042) Adequate oxygenation and airway patent Complications related to anesthesia: None Post-anesthesia assessment completed. No concerns Signed By: Rosibel Gill MD   
 March 29, 2019 Procedure(s): REMOVAL BONE SPUR RIGHT FOOT WITH REPAIR OF TENDON EXTENSOR  RIGHT FOOT. general 
 
<BSHSIANPOST> Vitals Value Taken Time /54 3/29/2019 10:42 AM  
Temp 37.1 °C (98.8 °F) 3/29/2019 10:42 AM  
Pulse 107 3/29/2019 10:42 AM  
Resp 17 3/29/2019 10:42 AM  
SpO2 98 % 3/29/2019 10:42 AM  
Vitals shown include unvalidated device data.

## 2019-03-29 NOTE — BRIEF OP NOTE
BRIEF OPERATIVE NOTE Date of Procedure: 3/29/2019 Preoperative Diagnosis: EXOSTOSIS AND TENDON TEAR RIGHT FOOT Postoperative Diagnosis: EXOSTOSIS AND TENDON TEAR RIGHT FOOT Procedure(s): REMOVAL BONE SPUR RIGHT FOOT WITH REPAIR OF TENDON EXTENSOR  RIGHT FOOT Surgeon(s) and Role: 
Kp Garcia MD - Primary Surgical Assistant: None Surgical Staff: 
Circ-1: Lalitha Cantrell RN Scrub RN-1: Rene Bonilla RN Event Time In Time Out Incision Start 1003 Incision Close 1027 Anesthesia: General  
Estimated Blood Loss: Minimal 
Specimens: None Findings: Bone exostosis right foot dorsal 
Complications: None Implants: None

## 2019-03-29 NOTE — ROUTINE PROCESS
Patient: Ligia Coughlin MRN: [id number]  SSN: [id number] YOB: 1975  Age: 39 y.o. Sex: female Patient is status post Procedure(s): REMOVAL BONE SPUR RIGHT FOOT WITH REPAIR OF TENDON EXTENSOR  RIGHT FOOT. Surgeon(s) and Role: Georgette Posadas MD - Primary Local/Dose/Irrigation:  See [facility] Peripheral IV 03/29/19 Left (Active) Airway - Endotracheal Tube 03/29/19 (Active) Dressing/Packing:   steri-strips, 4x4's, bennie, coban Splint/Cast:  ] Other:

## 2019-03-29 NOTE — ANESTHESIA PREPROCEDURE EVALUATION
Relevant Problems No relevant active problems Anesthetic History No history of anesthetic complications Review of Systems / Medical History Patient summary reviewed, nursing notes reviewed and pertinent labs reviewed Pulmonary Asthma : well controlled Neuro/Psych Psychiatric history Cardiovascular Within defined limits Exercise tolerance: >4 METS 
  
GI/Hepatic/Renal 
Within defined limits Endo/Other Diabetes Other Findings Comments: Celiac disease Physical Exam 
 
Airway Mallampati: II 
TM Distance: 4 - 6 cm Neck ROM: normal range of motion Mouth opening: Normal 
 
 Cardiovascular Regular rate and rhythm,  S1 and S2 normal,  no murmur, click, rub, or gallop Dental 
No notable dental hx Pulmonary Breath sounds clear to auscultation Abdominal 
GI exam deferred Other Findings Anesthetic Plan ASA: 2 Anesthesia type: general 
 
 
 
 
Induction: Intravenous Anesthetic plan and risks discussed with: Patient

## 2019-03-30 NOTE — OP NOTES
[facility]  OPERATIVE REPORT    Name:  Epifanio Hodges  MR#:  [id number]  :  1975  ACCOUNT #:  [de-identified]  DATE OF SERVICE:  2019      PREOPERATIVE DIAGNOSES:  1. Exostosis deformity, dorsal aspect, right foot. 2.  Tendon tear, extensor tendon, dorsal right foot. POSTOPERATIVE DIAGNOSES:  1. Exostosis deformity, dorsal aspect, right foot. 2.  Tendon tear, extensor tendon, dorsal right foot. PROCEDURES PERFORMED:  1. Resection of exostosis deformity, dorsal aspect, right foot. 2.  Repair of tendon, dorsal aspect, right foot. SURGEON:  Christopher Silvestre MD    ASSISTANT:  None. ANESTHESIA:  General.    COMPLICATIONS:  None apparent. SPECIMENS REMOVED:  None. IMPLANTS:  None. ESTIMATED BLOOD LOSS:  Minimal.    HEMOSTASIS:  Ankle tourniquet, right side. INJECTABLES:  A 12 mL of 0.5% Marcaine with epinephrine, right foot preoperatively. A 4 mL mixture of 1 mL Decadron and 3 mL of 0.5% Marcaine with epinephrine postoperatively, right foot. OPERATIVE TECHNIQUE:  The patient was brought to the operating room and placed on the operating room table in the supine position. Anesthesiologist administered general anesthesia followed by locally infiltrated block with the above-mentioned anesthesia. The right foot was then prepped and draped in the usual aseptic technique over the well padded right ankle tourniquet. The tourniquet was inflated on the right side and used throughout the procedure. An incision was placed over the dorsal aspect just medial to the first metatarsal cuneiform joint. Care was taken to avoid the neurovascular structures. Metzenbaum scissors were then utilized to bring the incision past the subcutaneous tissue down to the capsule level. The extensor tendon was encountered and a tear was seen medially approximately 5 mm in length.   A medial incision along the capsule was placed to expose the first and second cuneiform dorsal aspect into the surgical site. Retraction was carried out. Care was taken to retract the nerve gently all the way. The bony exostosis was resected from the cuneiform and portion of the dorsal first and second metatarsal bases. This was then smoothed with a rotary bur. The area was copiously irrigated with sterile saline solution. The area was found to be saucerized at this point, and the area was copiously irrigated with sterile saline solution. The skin flap was then placed over the previous deformity showing correction of the deformity with palpation clinically. The area was then retracted again and access was gained to the extensor tendon, where a 3-0 Vicryl was utilized used to repair the tendon tear along this area in the medial margin. Once this was completed, the deeper tissues were reapproximated to coaptate with 3-0 Vicryl. The skin was reapproximated to coaptate with 5-0 Vicryl in a subcuticular fashion in a running stitch. The incision was reinforced with half inch Steri-Strips. The postoperative block and anti-inflammatory therapeutic injection was then placed at this point in right foot. Tourniquet was deflated in the right side showing immediate hyperemic flush to digits 1-5 the right side. Dressing was completed with 4x4's, 3-inch Kerlix, and 3-inch Ace wrap, right foot. The patient tolerated the above procedures and anesthesia well and was discharged from the operating room with vital signs stable and vascular status intact to the right foot. Prognosis for this patient is satisfactory.       Jacqueline Chong MD      [id number]  D:  03/29/2019 13:04  T:  03/29/2019 16:52  JOB #:  [id number]

## 2019-04-01 LAB
GLUCOSE BLD STRIP.AUTO-MCNC: NORMAL MG/DL (ref 65–100)
SERVICE CMNT-IMP: NORMAL

## 2019-05-03 ENCOUNTER — HOSPITAL ENCOUNTER (OUTPATIENT)
Dept: CT IMAGING | Age: 44
Discharge: HOME | End: 2019-05-03
Attending: INTERNAL MEDICINE
Payer: COMMERCIAL

## 2019-05-03 ENCOUNTER — OFFICE VISIT (OUTPATIENT)
Dept: INTERNAL MEDICINE CLINIC | Age: 44
End: 2019-05-03

## 2019-05-03 VITALS
HEIGHT: 61 IN | WEIGHT: 154 LBS | DIASTOLIC BLOOD PRESSURE: 66 MMHG | HEART RATE: 90 BPM | RESPIRATION RATE: 14 BRPM | TEMPERATURE: 98.2 F | BODY MASS INDEX: 29.07 KG/M2 | SYSTOLIC BLOOD PRESSURE: 98 MMHG | OXYGEN SATURATION: 97 %

## 2019-05-03 DIAGNOSIS — R10.32 LLQ ABDOMINAL PAIN: Primary | ICD-10-CM

## 2019-05-03 DIAGNOSIS — R10.32 LLQ ABDOMINAL PAIN: ICD-10-CM

## 2019-05-03 LAB
BILIRUB UR QL STRIP: NEGATIVE
GLUCOSE UR-MCNC: NEGATIVE MG/DL
KETONES P FAST UR STRIP-MCNC: NEGATIVE MG/DL
PH UR STRIP: 7 [PH] (ref 4.6–8)
PROT UR QL STRIP: NEGATIVE
SP GR UR STRIP: 1.02 (ref 1–1.03)
UA UROBILINOGEN AMB POC: NORMAL (ref 0.2–1)
URINALYSIS CLARITY POC: NORMAL
URINALYSIS COLOR POC: YELLOW
URINE BLOOD POC: NORMAL
URINE LEUKOCYTES POC: NEGATIVE
URINE NITRITES POC: NEGATIVE

## 2019-05-03 PROCEDURE — 74011000258 HC RX REV CODE- 258: Performed by: RADIOLOGY

## 2019-05-03 PROCEDURE — 74177 CT ABD & PELVIS W/CONTRAST: CPT

## 2019-05-03 PROCEDURE — 74011636320 HC RX REV CODE- 636/320: Performed by: RADIOLOGY

## 2019-05-03 RX ORDER — SODIUM CHLORIDE 0.9 % (FLUSH) 0.9 %
10 SYRINGE (ML) INJECTION
Status: COMPLETED | OUTPATIENT
Start: 2019-05-03 | End: 2019-05-03

## 2019-05-03 RX ADMIN — IOPAMIDOL 100 ML: 755 INJECTION, SOLUTION INTRAVENOUS at 17:56

## 2019-05-03 RX ADMIN — SODIUM CHLORIDE 100 ML: 900 INJECTION, SOLUTION INTRAVENOUS at 17:56

## 2019-05-03 RX ADMIN — IOHEXOL 50 ML: 240 INJECTION, SOLUTION INTRATHECAL; INTRAVASCULAR; INTRAVENOUS; ORAL at 17:56

## 2019-05-03 RX ADMIN — Medication 10 ML: at 17:56

## 2019-05-03 NOTE — PROGRESS NOTES
Scheduled CT at [facility] at 5:30, arrival at 3:30 to drink barium.  to get authorization. Nothing by mouth prior to test except water explained to patient.

## 2019-05-03 NOTE — PROGRESS NOTES
Adrienne Lennon is a 39 y.o. female who was seen in clinic today (5/3/2019) for an acute visit. Assessment & Plan:   Diagnoses and all orders for this visit:    1. LLQ abdominal pain- this is a recurrent problem, symptoms are not improving, differential dx reviewed with the patient, and favor a GI etiology more then GYN or . Will get CT scan set up to rule out diverticulitis or stone. Red flags were reviewed with the patient to RTC or notify me. See AVS.   -     CT ABD PELV W CONT; Future         Follow-up and Dispositions    · Follow up - TBD        Subjective:   Soumya Brooks was seen today for Abdominal Pain    Abdominal Pain  Zofia Lennon is here to talk about abdominal pain. This is a new problem and symptoms began 14 hours ago and have not changed. Pain is lower abdomen in location and described as feeling like she needs to have a BM but can't. She also reports the following symptoms: nausea. She symptoms are aggravated by none. She has tried none. She did have a BM today. No straining, no pain w/ defection, no brbpr, or melena. Past history includes: celiac. She reports 2 weeks she had exact same feelings, lasted for 3 hrs, resolved, and never returned until now. She felt \"blah\" this week. She thought there was some gluten cross contamination. Prior to Admission medications    Medication Sig Start Date End Date Taking? Authorizing Provider   echinacea 400 mg cap Take  by mouth. Yes Provider, Historical   methylphenidate HCl (RITALIN) 5 mg tablet Take 1 Tab (5 mg total) by mouth three (3) times dailyEarliest Fill Date: 4/4/19. Max Daily Amount: 15 mg  Patient taking differently: Take 5 mg by mouth as needed. 4/4/19  Yes Angel Mayfield MD   gabapentin (NEURONTIN) 100 mg capsule Take 200 mg by mouth nightly. Yes Provider, Historical   ethynodiol d-ethinyl estradiol (KELNOR 1/35, 28, PO) Take  by mouth.    Yes Provider, Historical   Iron BisGl &PS [id number] 550-99-70-7 mg-mg-mcg-mg cap Take  by mouth. Yes Provider, Historical   OTHER Berberine   Yes Provider, Historical   potassium/magnesium (MAGNESIUM-POTASSIUM PO) Take  by mouth. Yes Provider, Historical   albuterol (PROVENTIL HFA, VENTOLIN HFA, PROAIR HFA) 90 mcg/actuation inhaler Take 1 Puff by inhalation every six (6) hours as needed for Wheezing. 2/1/18  Yes Velia Thomas MD   FLUoxetine (PROZAC) 20 mg capsule Take  by mouth daily. Yes Provider, Historical   metFORMIN (GLUCOPHAGE) 500 mg tablet Take 1,000 mg by mouth nightly. Yes Provider, Historical   ASCORBATE CALCIUM (VITAMIN C PO) Take 1,000 mg by mouth daily. Yes Provider, Historical   CHOLECALCIFEROL, VITAMIN D3, (VITAMIN D3 PO) Take 1,000 Units by mouth daily. Yes Provider, Historical   Cetirizine (ZYRTEC) 10 mg cap Take  by mouth daily. Yes Provider, Historical          Allergies   Allergen Reactions    Erythromycin With Ethanol Nausea Only    Amoxicillin Unknown (comments)     Resistance    Wheat Swelling           Review of Systems   Constitutional: Negative for chills and fever. Genitourinary: Negative for dysuria, frequency, hematuria and urgency. Objective:   Physical Exam   Constitutional: No distress. Eyes: Conjunctivae are normal. No scleral icterus. Cardiovascular: Regular rhythm and normal heart sounds. No murmur heard. Pulmonary/Chest: Effort normal and breath sounds normal. She has no wheezes. She has no rales. Abdominal: She exhibits no mass. Bowel sounds are decreased. There is no hepatosplenomegaly. There is tenderness in the suprapubic area and left lower quadrant. Lymphadenopathy:     She has no cervical adenopathy.          Visit Vitals  BP 98/66   Pulse 90   Temp 98.2 °F (36.8 °C) (Oral)   Resp 14   Ht 5' 1\" (1.549 m)   Wt 154 lb (69.9 kg)   SpO2 97%   BMI 29.10 kg/m²         Disclaimer:  Advised her to call back or return to office if symptoms worsen/change/persist.  Discussed expected course/resolution/complications of diagnosis in detail with patient. Medication risks/benefits/costs/interactions/alternatives discussed with patient. She was given an after visit summary which includes diagnoses, current medications, & vitals. She expressed understanding with the diagnosis and plan. Aspects of this note may have been generated using voice recognition software. Despite editing, there may be some syntax errors.        Orville Bosworth, MD

## 2019-05-03 NOTE — PATIENT INSTRUCTIONS

## 2019-05-04 NOTE — PROGRESS NOTES
[facility] reviewed. [facility]  ? Kidney stone. Will push fluids and recommend NSAIDs.

## 2019-05-06 ENCOUNTER — TELEPHONE (OUTPATIENT)
Dept: INTERNAL MEDICINE CLINIC | Age: 44
End: 2019-05-06

## 2019-05-06 NOTE — TELEPHONE ENCOUNTER
Patient given CT scan results, patient report she had \"heavy vaginal bleeding Saturday morning, but stopped after a few minutes\". States she has contacted her OBGYN and is waiting to hear back from them. 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

## 2019-05-06 NOTE — TELEPHONE ENCOUNTER
CT results came back late Friday night. Was released to her via my chart. CT scan was normal, result comments. Will inquire how she is feeling. Changes?

## 2019-08-31 LAB
CREATININE, EXTERNAL: 0.7
LDL-C, EXTERNAL: 142

## 2019-10-23 ENCOUNTER — OFFICE VISIT (OUTPATIENT)
Dept: INTERNAL MEDICINE CLINIC | Age: 44
End: 2019-10-23

## 2019-10-23 VITALS
SYSTOLIC BLOOD PRESSURE: 130 MMHG | HEIGHT: 61 IN | BODY MASS INDEX: 29.27 KG/M2 | RESPIRATION RATE: 16 BRPM | DIASTOLIC BLOOD PRESSURE: 70 MMHG | HEART RATE: 73 BPM | TEMPERATURE: 97.6 F | OXYGEN SATURATION: 98 % | WEIGHT: 155 LBS

## 2019-10-23 DIAGNOSIS — J68.3 REACTIVE AIRWAYS DYSFUNCTION SYNDROME (HCC): Primary | ICD-10-CM

## 2019-10-23 DIAGNOSIS — R05.3 PERSISTENT COUGH FOR 3 WEEKS OR LONGER: ICD-10-CM

## 2019-10-23 DIAGNOSIS — J40 BRONCHITIS: ICD-10-CM

## 2019-10-23 RX ORDER — BENZONATATE 100 MG/1
100 CAPSULE ORAL
COMMUNITY
End: 2020-02-14

## 2019-10-23 RX ORDER — FLUTICASONE PROPIONATE AND SALMETEROL 250; 50 UG/1; UG/1
1 POWDER RESPIRATORY (INHALATION) EVERY 12 HOURS
Qty: 1 INHALER | Refills: 0 | Status: SHIPPED | OUTPATIENT
Start: 2019-10-23 | End: 2019-11-14 | Stop reason: SDUPTHER

## 2019-10-23 RX ORDER — AZITHROMYCIN 250 MG/1
250 TABLET, FILM COATED ORAL SEE ADMIN INSTRUCTIONS
Qty: 6 TAB | Refills: 0 | Status: SHIPPED | OUTPATIENT
Start: 2019-10-23 | End: 2019-10-28

## 2019-10-23 RX ORDER — ALBUTEROL SULFATE 90 UG/1
1 AEROSOL, METERED RESPIRATORY (INHALATION)
Qty: 1 INHALER | Refills: 1 | Status: SHIPPED | OUTPATIENT
Start: 2019-10-23

## 2019-10-23 NOTE — PROGRESS NOTES
Verified name and birth date for privacy precautions. Chart reviewed in preparation for today's visit. Chief Complaint   Patient presents with    Cough     dry cough x 1 month. allergist gave her prednisone without relief           Health Maintenance Due   Topic    PAP AKA CERVICAL CYTOLOGY     Influenza Age 5 to Adult          Wt Readings from Last 3 Encounters:   10/23/19 155 lb (70.3 kg)   05/03/19 154 lb (69.9 kg)   03/29/19 154 lb (69.9 kg)     Temp Readings from Last 3 Encounters:   10/23/19 97.6 °F (36.4 °C) (Oral)   05/03/19 98.2 °F (36.8 °C) (Oral)   03/29/19 98.8 °F (37.1 °C)     BP Readings from Last 3 Encounters:   10/23/19 130/70   05/03/19 98/66   03/29/19 123/74     Pulse Readings from Last 3 Encounters:   10/23/19 73   05/03/19 90   03/29/19 84         Learning Assessment:  :     Learning Assessment 12/19/2016   PRIMARY LEARNER Patient   HIGHEST LEVEL OF EDUCATION - PRIMARY LEARNER  > 4 YEARS OF COLLEGE   BARRIERS PRIMARY LEARNER NONE   PRIMARY LANGUAGE ENGLISH   LEARNER PREFERENCE PRIMARY DEMONSTRATION   ANSWERED BY patient   RELATIONSHIP SELF       Depression Screening:  :     3 most recent PHQ Screens 5/3/2019   Little interest or pleasure in doing things Not at all   Feeling down, depressed, irritable, or hopeless Several days   Total Score PHQ 2 1       Fall Risk Assessment:  :     No flowsheet data found. Abuse Screening:  :     Abuse Screening Questionnaire 5/3/2019   Do you ever feel afraid of your partner? N   Are you in a relationship with someone who physically or mentally threatens you? N   Is it safe for you to go home?  Rhiannon Huang

## 2019-10-23 NOTE — PATIENT INSTRUCTIONS
1. Mucinex (Guaifenesen) plain-Blue Box 600 mg. Take one twice daily with full glass water   Take for 10 days    2. Saline Nasal Spray - use liberally to flush post-nasal area; use as many times a day as desired. Keep spraying with head tilted back until you feel need to swallow    3. Drink lots of fluids (mainly water) to keep mucus thinner    4. If needed, for cough, we recommend Delsym cough syrup    5. Long acting antihistamine (Allegra/Fexofenadine or Zyrtec/Ceterizine) is useful if allergy symptoms are also present    6. Decongestants should not be used as they actually contribute to overdrying/thickening of the mucus, and can raise the BP and overstimulate the heart    7.  Steroid nasal spray (Nasacort or Flonase) - 2 sprays each nostril once daily; use with head in upright position

## 2019-10-23 NOTE — PROGRESS NOTES
39 yo female reports a dry cough for a month. After a course of Prednisone from her Allergist in early Oct, the cough seemed to improve, then came back \"with a vengence\". She is also on daily Zyrtec, Tessalon perles and uses her Albuterol Inhaler prn - not really able to use it properly due to the cough not letting her get a deep breath. She has a hx of seasonal asthma/allergies. She is able to sleep. 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 She has had no fever. PE: WNWD WF w/ dry recurrent cough   T - 97.6   Nasal membranes - boggy   Phar - posterior lymphoid tissue accentuated   Ears - TMs slightly dull   Neck - no palp nodes   Lungs - clear    Imp: Persistent cough   Reactive Airways    Plan: Z-pack   Wixela inhaler   Refill rescue inhaler   Hydration   Mucinex   Steroid NS  ______________________________  Expected course of current diagnosed problem(s) as well as expected progression and possible complications, and desired follow up with provider are discussed with patient. Patient is encouraged to be back in touch with any questions or concerns. Patient expresses understanding of plan of care. Patient is given AVS which includes diagnoses, current medications, vitals.

## 2019-11-14 DIAGNOSIS — J68.3 REACTIVE AIRWAYS DYSFUNCTION SYNDROME (HCC): ICD-10-CM

## 2019-11-14 RX ORDER — FLUTICASONE PROPIONATE AND SALMETEROL 250; 50 UG/1; UG/1
POWDER RESPIRATORY (INHALATION)
Qty: 1 EACH | Refills: 2 | Status: SHIPPED | OUTPATIENT
Start: 2019-11-14

## 2020-02-14 ENCOUNTER — OFFICE VISIT (OUTPATIENT)
Dept: INTERNAL MEDICINE CLINIC | Age: 45
End: 2020-02-14

## 2020-02-14 VITALS
SYSTOLIC BLOOD PRESSURE: 110 MMHG | BODY MASS INDEX: 29.45 KG/M2 | DIASTOLIC BLOOD PRESSURE: 64 MMHG | RESPIRATION RATE: 12 BRPM | HEART RATE: 90 BPM | TEMPERATURE: 98.2 F | WEIGHT: 156 LBS | HEIGHT: 61 IN | OXYGEN SATURATION: 98 %

## 2020-02-14 DIAGNOSIS — Z00.00 ROUTINE PHYSICAL EXAMINATION: Primary | ICD-10-CM

## 2020-02-14 DIAGNOSIS — G47.11 HYPERSOMNIA, IDIOPATHIC: ICD-10-CM

## 2020-02-14 DIAGNOSIS — F41.9 ANXIETY: ICD-10-CM

## 2020-02-14 DIAGNOSIS — E66.3 OVERWEIGHT (BMI 25.0-29.9): ICD-10-CM

## 2020-02-14 DIAGNOSIS — L20.84 INTRINSIC ECZEMA: ICD-10-CM

## 2020-02-14 RX ORDER — FLUOXETINE HYDROCHLORIDE 40 MG/1
CAPSULE ORAL DAILY
COMMUNITY

## 2020-02-14 NOTE — PATIENT INSTRUCTIONS
Well Visit, Ages 25 to 48: Care Instructions  Your Care Instructions    Physical exams can help you stay healthy. Your doctor has checked your overall health and may have suggested ways to take good care of yourself. He or she also may have recommended tests. At home, you can help prevent illness with healthy eating, regular exercise, and other steps. Follow-up care is a key part of your treatment and safety. Be sure to make and go to all appointments, and call your doctor if you are having problems. It's also a good idea to know your test results and keep a list of the medicines you take. How can you care for yourself at home? · Reach and stay at a healthy weight. This will lower your risk for many problems, such as obesity, diabetes, heart disease, and high blood pressure. · Get at least 30 minutes of physical activity on most days of the week. Walking is a good choice. You also may want to do other activities, such as running, swimming, cycling, or playing tennis or team sports. Discuss any changes in your exercise program with your doctor. · Do not smoke or allow others to smoke around you. If you need help quitting, talk to your doctor about stop-smoking programs and medicines. These can increase your chances of quitting for good. · Talk to your doctor about whether you have any risk factors for sexually transmitted infections (STIs). Having one sex partner (who does not have STIs and does not have sex with anyone else) is a good way to avoid these infections. · Use birth control if you do not want to have children at this time. Talk with your doctor about the choices available and what might be best for you. · Protect your skin from too much sun. When you're outdoors from 10 a.m. to 4 p.m., stay in the shade or cover up with clothing and a hat with a wide brim. Wear sunglasses that block UV rays. Even when it's cloudy, put broad-spectrum sunscreen (SPF 30 or higher) on any exposed skin.   · See a dentist one or two times a year for checkups and to have your teeth cleaned. · Wear a seat belt in the car. Follow your doctor's advice about when to have certain tests. These tests can spot problems early. For everyone  · Cholesterol. Have the fat (cholesterol) in your blood tested after age 21. Your doctor will tell you how often to have this done based on your age, family history, or other things that can increase your risk for heart disease. · Blood pressure. Have your blood pressure checked during a routine doctor visit. Your doctor will tell you how often to check your blood pressure based on your age, your blood pressure results, and other factors. · Vision. Talk with your doctor about how often to have a glaucoma test.  · Diabetes. Ask your doctor whether you should have tests for diabetes. · Colon cancer. Your risk for colorectal cancer gets higher as you get older. Some experts say that adults should start regular screening at age 48 and stop at age 76. Others say to start before age 48 or continue after age 76. Talk with your doctor about your risk and when to start and stop screening. For women  · Breast exam and mammogram. Talk to your doctor about when you should have a clinical breast exam and a mammogram. Medical experts differ on whether and how often women under 50 should have these tests. Your doctor can help you decide what is right for you. · Cervical cancer screening test and pelvic exam. Begin with a Pap test at age 24. The test often is part of a pelvic exam. Starting at age 27, you may choose to have a Pap test, an HPV test, or both tests at the same time (called co-testing). Talk with your doctor about how often to have testing. · Tests for sexually transmitted infections (STIs). Ask whether you should have tests for STIs. You may be at risk if you have sex with more than one person, especially if your partners do not wear condoms.   For men  · Tests for sexually transmitted infections (STIs). Ask whether you should have tests for STIs. You may be at risk if you have sex with more than one person, especially if you do not wear a condom. · Testicular cancer exam. Ask your doctor whether you should check your testicles regularly. · Prostate exam. Talk to your doctor about whether you should have a blood test (called a PSA test) for prostate cancer. Experts differ on whether and when men should have this test. Some experts suggest it if you are older than 39 and are -American or have a father or brother who got prostate cancer when he was younger than 72. When should you call for help? Watch closely for changes in your health, and be sure to contact your doctor if you have any problems or symptoms that concern you. Where can you learn more? Go to http://zaire-bebe.info/. Enter P072 in the search box to learn more about \"Well Visit, Ages 25 to 48: Care Instructions. \"  Current as of: December 13, 2018  Content Version: 12.2  © 1038-5184 [employer]. Care instructions adapted under license by [employer] (which disclaims liability or warranty for this information). If you have questions about a medical condition or this instruction, always ask your healthcare professional. [place] [address] any warranty or liability for your use of this information.

## 2020-02-14 NOTE — PROGRESS NOTES
Sheyla Lennon is a 38 y.o. female who was seen in clinic today (2/14/2020) for a full physical.  Form for [employer] completed. Assessment & Plan:   Diagnoses and all orders for this visit:    1. Routine physical examination       -     Previous labs reviewed & discussed with her     2. Hypersomnia, idiopathic- well controlled, improved w/ iron supplement, taking BID, reviewed can decrease to daily and reassess    3. Overweight (BMI 25.0-29. 9)- stable, needs improvement, I have recommended the following interventions: encourage exercise and lifestyle education regarding diet. 4. Anxiety- well controlled, continue current treatment as outlined by specialist, will defer to specialist     5. Intrinsic eczema- this is a new problem, differential dx reviewed with the patient, looks classic. Will start w/ moisterizer and minimize hand  use. Reviewed OTC hydrocortisone vs Rx steroid cream.  Discussed when to see dermatology. Red flags were reviewed with the patient to RTC or notify me, expected time course for resolution reviewed. Follow-up and Dispositions    · Return in about 2 years (around 2/14/2022) for FULL PHYSICAL - 30 minutes. ------------------------------------------------------------------------------------------    Subjective:   [place] is here today for a full physical.      Health Maintenance  Immunizations:   Influenza: up to date   Tetanus: up to date    Cancer screening:   Cervical: reviewed guidelines, UTD, done by OBGYN, records requested  Breast: reviewed guidelines, UTD, done by OBGYN, records requested.        Patient Care Team:  Ryan Dey MD as PCP - General (Internal Medicine)  Ryan Dey MD as PCP - [facility]  Blayne Dahl MD (Obstetrics & Gynecology)  Leatrice Canavan, MD (Allergy)  Nicci Avendano MD (Dermatology)  Isadora Castellon MD (Sleep Medicine)         The following sections were reviewed & updated as appropriate: PMH, PSH, FH, and SH. Patient Active Problem List   Diagnosis Code    Celiac disease K90.0    Perennial allergic rhinitis J30.89    Pre-diabetes R73.03    Hypersomnia, idiopathic G47.11    Anxiety F41.9    Mal de debarquement R42          Prior to Admission medications    Medication Sig Start Date End Date Taking? Authorizing Provider   FLUoxetine (PROZAC) 40 mg capsule Take  by mouth daily. Yes Provider, Historical   levonorgestreL (MIRENA) 20 mcg/24 hours (5 yrs) 52 mg IUD 1 Device by IntraUTERine route once. Yes Provider, Historical   WIXELA INHUB 250-50 mcg/dose diskus inhaler TAKE 1 PUFF BY INHALATION EVERY TWELVE (12) HOURS. 11/14/19  Yes Reyes Cifuentes MD   albuterol (PROVENTIL HFA, VENTOLIN HFA, PROAIR HFA) 90 mcg/actuation inhaler Take 1 Puff by inhalation every six (6) hours as needed for Wheezing. 10/23/19  Yes Emilia Smith NP   echinacea 400 mg cap Take  by mouth. Yes Provider, Historical   Iron BisGl &PS [id number] mg-mg-mcg-mg cap Take  by mouth two (2) times a day. Yes Provider, Historical   OTHER Berberine   Yes Provider, Historical   potassium/magnesium (MAGNESIUM-POTASSIUM PO) Take  by mouth daily. Yes Provider, Historical   metFORMIN (GLUCOPHAGE) 500 mg tablet Take 500 mg by mouth nightly. Yes Provider, Historical   ASCORBATE CALCIUM (VITAMIN C PO) Take 1,000 mg by mouth daily. Yes Provider, Historical   CHOLECALCIFEROL, VITAMIN D3, (VITAMIN D3 PO) Take 1,000 Units by mouth daily. Yes Provider, Historical   Cetirizine (ZYRTEC) 10 mg cap Take  by mouth daily. Yes Provider, Historical   benzonatate (TESSALON PERLES) 100 mg capsule Take 100 mg by mouth three (3) times daily as needed for Cough. 2/14/20  Provider, Historical   methylphenidate HCl (RITALIN) 5 mg tablet Take 1 Tab (5 mg total) by mouth three (3) times dailyEarliest Fill Date: 4/4/19. Max Daily Amount: 15 mg  Patient taking differently: Take 5 mg by mouth as needed. 4/4/19 2/14/20  Angel Mayfield MD   FLUoxetine (PROZAC) 20 mg capsule Take 40 mg by mouth daily. 2/14/20  Provider, Historical          Allergies   Allergen Reactions    Erythromycin With Ethanol Nausea Only    Amoxicillin Unknown (comments)     Resistance    Wheat Swelling              Review of Systems   Constitutional: Negative for chills and fever. Respiratory: Negative for cough and shortness of breath. Cardiovascular: Negative for chest pain and palpitations. Gastrointestinal: Negative for abdominal pain, blood in stool, constipation, diarrhea, heartburn, nausea and vomiting. Musculoskeletal: Positive for back pain (on/off, seeing chiropractor with good success). Negative for joint pain and myalgias. Skin: Positive for rash. Neurological: Negative for tingling, focal weakness and headaches. Endo/Heme/Allergies: Does not bruise/bleed easily. Psychiatric/Behavioral: Negative for depression. The patient is not nervous/anxious and does not have insomnia. Objective:   Physical Exam  Constitutional:       General: She is not in acute distress. Appearance: She is well-developed. HENT:      Right Ear: Tympanic membrane and ear canal normal.      Left Ear: Tympanic membrane and ear canal normal.      Mouth/Throat:      Mouth: Mucous membranes are moist.      Pharynx: No posterior oropharyngeal erythema. Eyes:      Conjunctiva/sclera: Conjunctivae normal.   Cardiovascular:      Rate and Rhythm: Regular rhythm. Heart sounds: Normal heart sounds. No murmur. Pulmonary:      Effort: Pulmonary effort is normal.      Breath sounds: Normal breath sounds. Abdominal:      General: Bowel sounds are normal.      Palpations: Abdomen is soft. Tenderness: There is no abdominal tenderness. Musculoskeletal:      Right lower leg: No edema. Left lower leg: No edema. Lymphadenopathy:      Cervical: No cervical adenopathy.    Skin:     Comments: R palm- dry skin, slight erythema, no open lesions, no bullae, no pustule   Psychiatric:         Mood and Affect: Mood and affect normal.            Visit Vitals  /64   Pulse 90   Temp 98.2 °F (36.8 °C) (Oral)   Resp 12   Ht 5' 1.35\" (1.558 m)   Wt 156 lb (70.8 kg)   SpO2 98%   BMI 29.14 kg/m²          Advised her to call back or return to office if symptoms worsen/change/persist.  Discussed expected course/resolution/complications of diagnosis in detail with patient. Medication risks/benefits/costs/interactions/alternatives discussed with patient. She was given an after visit summary which includes diagnoses, current medications, & vitals. She expressed understanding with the diagnosis and plan. Aspects of this note may have been generated using voice recognition software. Despite editing, there may be some syntax errors.        Alban Dominique MD

## 2020-07-21 ENCOUNTER — HOSPITAL ENCOUNTER (OUTPATIENT)
Dept: MAMMOGRAPHY | Age: 45
Discharge: HOME | End: 2020-07-21
Attending: OBSTETRICS & GYNECOLOGY
Payer: COMMERCIAL

## 2020-07-21 DIAGNOSIS — Z12.31 VISIT FOR SCREENING MAMMOGRAM: ICD-10-CM

## 2020-07-21 PROCEDURE — 77063 BREAST TOMOSYNTHESIS BI: CPT

## 2021-06-07 ENCOUNTER — TRANSCRIBE ORDER (OUTPATIENT)
Dept: SCHEDULING | Age: 46
End: 2021-06-07

## 2021-06-07 DIAGNOSIS — Z12.31 VISIT FOR SCREENING MAMMOGRAM: Primary | ICD-10-CM

## 2021-07-22 ENCOUNTER — HOSPITAL ENCOUNTER (OUTPATIENT)
Dept: MAMMOGRAPHY | Age: 46
Discharge: HOME | End: 2021-07-22
Attending: OBSTETRICS & GYNECOLOGY
Payer: COMMERCIAL

## 2021-07-22 DIAGNOSIS — Z12.31 VISIT FOR SCREENING MAMMOGRAM: ICD-10-CM

## 2021-07-22 PROCEDURE — 77063 BREAST TOMOSYNTHESIS BI: CPT

## 2022-03-20 PROBLEM — R42 MAL DE DEBARQUEMENT: Status: ACTIVE | Noted: 2017-06-19

## 2022-07-06 LAB — HBA1C MFR BLD HPLC: 5.6 %

## 2022-07-07 ENCOUNTER — TRANSCRIBE ORDER (OUTPATIENT)
Dept: SCHEDULING | Age: 47
End: 2022-07-07

## 2022-07-07 DIAGNOSIS — Z12.31 SCREENING MAMMOGRAM FOR HIGH-RISK PATIENT: Primary | ICD-10-CM

## 2022-07-27 ENCOUNTER — HOSPITAL ENCOUNTER (OUTPATIENT)
Dept: MAMMOGRAPHY | Age: 47
Discharge: HOME | End: 2022-07-27
Attending: OBSTETRICS & GYNECOLOGY
Payer: COMMERCIAL

## 2022-07-27 DIAGNOSIS — Z12.31 SCREENING MAMMOGRAM FOR HIGH-RISK PATIENT: ICD-10-CM

## 2022-07-27 PROCEDURE — 77063 BREAST TOMOSYNTHESIS BI: CPT

## 2023-04-13 PROBLEM — J45.20 MILD INTERMITTENT ASTHMA WITHOUT COMPLICATION: Status: ACTIVE | Noted: 2023-04-13

## 2023-04-13 PROBLEM — E66.3 OVERWEIGHT: Status: ACTIVE | Noted: 2023-04-13

## 2023-04-13 PROBLEM — E28.2 PCOS (POLYCYSTIC OVARIAN SYNDROME): Status: ACTIVE | Noted: 2023-04-13

## 2023-04-23 PROBLEM — F50.81 BINGE EATING DISORDER: Status: ACTIVE | Noted: 2023-04-23

## 2023-04-23 PROBLEM — F50.819 BINGE EATING DISORDER: Status: ACTIVE | Noted: 2023-04-23

## 2023-04-23 PROBLEM — Z12.4 CERVICAL CANCER SCREENING: Status: ACTIVE | Noted: 2023-04-23

## 2023-05-23 PROBLEM — Z12.4 CERVICAL CANCER SCREENING: Status: RESOLVED | Noted: 2023-04-23 | Resolved: 2023-05-23

## 2023-06-30 ENCOUNTER — TRANSCRIBE ORDERS (OUTPATIENT)
Facility: HOSPITAL | Age: 48
End: 2023-06-30

## 2023-06-30 DIAGNOSIS — Z12.31 SCREENING MAMMOGRAM FOR HIGH-RISK PATIENT: Primary | ICD-10-CM

## 2023-07-28 ENCOUNTER — HOSPITAL ENCOUNTER (OUTPATIENT)
Facility: HOSPITAL | Age: 48
Discharge: HOME | End: 2023-07-28
Payer: COMMERCIAL

## 2023-07-28 VITALS — BODY MASS INDEX: 29.07 KG/M2 | HEIGHT: 61 IN | WEIGHT: 154 LBS

## 2023-07-28 DIAGNOSIS — Z12.31 SCREENING MAMMOGRAM FOR HIGH-RISK PATIENT: ICD-10-CM

## 2023-07-28 PROCEDURE — 77063 BREAST TOMOSYNTHESIS BI: CPT

## 2024-06-21 ENCOUNTER — TRANSCRIBE ORDERS (OUTPATIENT)
Facility: HOSPITAL | Age: 49
End: 2024-06-21

## 2024-06-21 DIAGNOSIS — Z12.31 SCREENING MAMMOGRAM FOR HIGH-RISK PATIENT: Primary | ICD-10-CM

## 2024-07-29 ENCOUNTER — HOSPITAL ENCOUNTER (OUTPATIENT)
Facility: HOSPITAL | Age: 49
Discharge: HOME | End: 2024-08-01
Attending: OBSTETRICS & GYNECOLOGY
Payer: COMMERCIAL

## 2024-07-29 VITALS — HEIGHT: 61 IN | BODY MASS INDEX: 29.09 KG/M2 | WEIGHT: 154.1 LBS

## 2024-07-29 DIAGNOSIS — Z12.31 SCREENING MAMMOGRAM FOR HIGH-RISK PATIENT: ICD-10-CM

## 2024-07-29 PROCEDURE — 77063 BREAST TOMOSYNTHESIS BI: CPT

## 2025-05-09 ENCOUNTER — TRANSCRIBE ORDERS (OUTPATIENT)
Facility: HOSPITAL | Age: 50
End: 2025-05-09

## 2025-05-09 DIAGNOSIS — E78.2 MIXED HYPERLIPIDEMIA: Primary | ICD-10-CM

## 2025-05-19 ENCOUNTER — TRANSCRIBE ORDERS (OUTPATIENT)
Facility: HOSPITAL | Age: 50
End: 2025-05-19

## 2025-05-19 DIAGNOSIS — Z12.31 ENCOUNTER FOR SCREENING MAMMOGRAM FOR MALIGNANT NEOPLASM OF BREAST: Primary | ICD-10-CM

## 2025-07-30 ENCOUNTER — HOSPITAL ENCOUNTER (OUTPATIENT)
Facility: HOSPITAL | Age: 50
Discharge: HOME | End: 2025-08-02

## 2025-07-30 ENCOUNTER — HOSPITAL ENCOUNTER (OUTPATIENT)
Facility: HOSPITAL | Age: 50
Discharge: HOME | End: 2025-08-02
Payer: COMMERCIAL

## 2025-07-30 DIAGNOSIS — E78.2 MIXED HYPERLIPIDEMIA: ICD-10-CM

## 2025-07-30 DIAGNOSIS — Z12.31 ENCOUNTER FOR SCREENING MAMMOGRAM FOR MALIGNANT NEOPLASM OF BREAST: ICD-10-CM

## 2025-07-30 PROCEDURE — 75571 CT HRT W/O DYE W/CA TEST: CPT

## 2025-07-30 PROCEDURE — 77063 BREAST TOMOSYNTHESIS BI: CPT

## (undated) DEVICE — NEEDLE HYPO 25GA L1.5IN BVL ORIENTED ECLIPSE

## (undated) DEVICE — DISPOSABLE TOURNIQUET CUFF SINGLE BLADDER, DUAL PORT AND QUICK CONNECT CONNECTOR: Brand: COLOR CUFF

## (undated) DEVICE — PENCIL ES L3M BTTN SWCH S STL HEX LOK BLDE ELECTRD HOLSTER

## (undated) DEVICE — SUTURE VCRL SZ 1 L27IN ABSRB VLT L36MM CT-1 1/2 CIR J341H

## (undated) DEVICE — SYR 10ML LUER LOK 1/5ML GRAD --

## (undated) DEVICE — STRIP,CLOSURE,WOUND,MEDI-STRIP,1/2X4: Brand: MEDLINE

## (undated) DEVICE — SUTURE VCRL SZ 3-0 L27IN ABSRB UD L26MM SH 1/2 CIR J416H

## (undated) DEVICE — STERILE POLYISOPRENE POWDER-FREE SURGICAL GLOVES: Brand: PROTEXIS

## (undated) DEVICE — KERLIX BANDAGE ROLL: Brand: KERLIX

## (undated) DEVICE — INFECTION CONTROL KIT SYS

## (undated) DEVICE — SYR IRR BLB 2OZ DISP BLU STRL -- CONVERT TO ITEM 357637

## (undated) DEVICE — STOCKINETTE: Brand: DEROYAL

## (undated) DEVICE — DRAPE,EXTREMITY,89X128,STERILE: Brand: MEDLINE

## (undated) DEVICE — 4.0MM EGG

## (undated) DEVICE — REM POLYHESIVE ADULT PATIENT RETURN ELECTRODE: Brand: VALLEYLAB

## (undated) DEVICE — SURGICAL PROCEDURE PACK BASIN MAJ SET CUST NO CAUT

## (undated) DEVICE — NEEDLE HYPO 18GA L1.5IN PNK S STL HUB POLYPR SHLD REG BVL

## (undated) DEVICE — PACK,BASIC,SIRUS,V: Brand: MEDLINE

## (undated) DEVICE — PREP SKN PREVAIL 40ML APPL --

## (undated) DEVICE — SMALL OSC. SAW BLADE, 9MM X 24.6MM X 0.38MM: Brand: MICROAIRE®

## (undated) DEVICE — BANDAGE COMPR 9 FTX4 IN SMOOTH COMFORTABLE SYNTH ESMRK LF

## (undated) DEVICE — SOLUTION IV 1000ML 0.9% SOD CHL